# Patient Record
Sex: FEMALE | Race: WHITE | Employment: OTHER | URBAN - METROPOLITAN AREA
[De-identification: names, ages, dates, MRNs, and addresses within clinical notes are randomized per-mention and may not be internally consistent; named-entity substitution may affect disease eponyms.]

---

## 2017-01-01 ENCOUNTER — LAB ENCOUNTER (OUTPATIENT)
Dept: LAB | Age: 62
End: 2017-01-01
Attending: INTERNAL MEDICINE
Payer: MEDICARE

## 2017-01-01 ENCOUNTER — NURSE ONLY (OUTPATIENT)
Dept: LAB | Age: 62
End: 2017-01-01
Attending: INTERNAL MEDICINE
Payer: COMMERCIAL

## 2017-01-01 ENCOUNTER — LAB ENCOUNTER (OUTPATIENT)
Dept: LAB | Age: 62
End: 2017-01-01
Attending: FAMILY MEDICINE
Payer: MEDICARE

## 2017-01-01 ENCOUNTER — LAB REQUISITION (OUTPATIENT)
Dept: LAB | Facility: HOSPITAL | Age: 62
End: 2017-01-01
Attending: INTERNAL MEDICINE
Payer: MEDICARE

## 2017-01-01 ENCOUNTER — APPOINTMENT (OUTPATIENT)
Dept: CT IMAGING | Facility: HOSPITAL | Age: 62
End: 2017-01-01
Attending: EMERGENCY MEDICINE
Payer: MEDICARE

## 2017-01-01 ENCOUNTER — APPOINTMENT (OUTPATIENT)
Dept: MRI IMAGING | Facility: HOSPITAL | Age: 62
End: 2017-01-01
Attending: HOSPITALIST
Payer: MEDICARE

## 2017-01-01 ENCOUNTER — LAB ENCOUNTER (OUTPATIENT)
Dept: LAB | Age: 62
End: 2017-01-01
Attending: INTERNAL MEDICINE
Payer: COMMERCIAL

## 2017-01-01 ENCOUNTER — NURSE ONLY (OUTPATIENT)
Dept: LAB | Age: 62
End: 2017-01-01
Attending: INTERNAL MEDICINE
Payer: MEDICARE

## 2017-01-01 ENCOUNTER — LAB REQUISITION (OUTPATIENT)
Dept: LAB | Facility: HOSPITAL | Age: 62
End: 2017-01-01
Attending: INTERNAL MEDICINE
Payer: COMMERCIAL

## 2017-01-01 ENCOUNTER — HOSPITAL ENCOUNTER (OUTPATIENT)
Facility: HOSPITAL | Age: 62
Setting detail: OBSERVATION
Discharge: NURSING, REHAB OR LONG-TERM CARE | End: 2017-01-01
Attending: EMERGENCY MEDICINE | Admitting: INTERNAL MEDICINE
Payer: MEDICARE

## 2017-01-01 ENCOUNTER — HOSPITAL ENCOUNTER (OUTPATIENT)
Facility: HOSPITAL | Age: 62
Setting detail: HOSPITAL OUTPATIENT SURGERY
Discharge: NURSING, REHAB OR LONG-TERM CARE | End: 2017-01-01
Attending: SURGERY | Admitting: SURGERY
Payer: MEDICARE

## 2017-01-01 VITALS
BODY MASS INDEX: 33.74 KG/M2 | WEIGHT: 150 LBS | HEIGHT: 56 IN | HEART RATE: 78 BPM | SYSTOLIC BLOOD PRESSURE: 158 MMHG | OXYGEN SATURATION: 99 % | TEMPERATURE: 98 F | RESPIRATION RATE: 14 BRPM | DIASTOLIC BLOOD PRESSURE: 87 MMHG

## 2017-01-01 VITALS
WEIGHT: 152.75 LBS | BODY MASS INDEX: 32.07 KG/M2 | HEART RATE: 66 BPM | DIASTOLIC BLOOD PRESSURE: 47 MMHG | RESPIRATION RATE: 18 BRPM | OXYGEN SATURATION: 96 % | SYSTOLIC BLOOD PRESSURE: 124 MMHG | TEMPERATURE: 99 F | HEIGHT: 57.99 IN

## 2017-01-01 DIAGNOSIS — N18.9 CKD (CHRONIC KIDNEY DISEASE): Primary | ICD-10-CM

## 2017-01-01 DIAGNOSIS — R52 INTRACTABLE PAIN: ICD-10-CM

## 2017-01-01 DIAGNOSIS — I10 HTN (HYPERTENSION): Primary | ICD-10-CM

## 2017-01-01 DIAGNOSIS — E11.9 DM (DIABETES MELLITUS) (HCC): Primary | ICD-10-CM

## 2017-01-01 DIAGNOSIS — I10 HTN (HYPERTENSION): ICD-10-CM

## 2017-01-01 DIAGNOSIS — E11.9 DM (DIABETES MELLITUS) (HCC): ICD-10-CM

## 2017-01-01 DIAGNOSIS — D64.9 ANEMIA: ICD-10-CM

## 2017-01-01 DIAGNOSIS — R76.0 ABNORMAL BLOOD CARDIOLIPIN RATIO: Primary | ICD-10-CM

## 2017-01-01 DIAGNOSIS — Z79.01 LONG TERM (CURRENT) USE OF ANTICOAGULANTS: Primary | ICD-10-CM

## 2017-01-01 DIAGNOSIS — E11.9 DIABETES MELLITUS (HCC): Primary | ICD-10-CM

## 2017-01-01 DIAGNOSIS — R69 ILLNESS: Primary | ICD-10-CM

## 2017-01-01 DIAGNOSIS — G72.41 IBM (INCLUSION BODY MYOSITIS): Primary | ICD-10-CM

## 2017-01-01 DIAGNOSIS — R53.1 WEAKNESS: ICD-10-CM

## 2017-01-01 DIAGNOSIS — I51.9 HEART DISEASE: ICD-10-CM

## 2017-01-01 DIAGNOSIS — M54.12 CERVICAL RADICULOPATHY: Primary | ICD-10-CM

## 2017-01-01 DIAGNOSIS — B99.9 INFECTION: Primary | ICD-10-CM

## 2017-01-01 DIAGNOSIS — R69 DIAGNOSIS UNKNOWN: Primary | ICD-10-CM

## 2017-01-01 DIAGNOSIS — N39.0 UTI (URINARY TRACT INFECTION): ICD-10-CM

## 2017-01-01 DIAGNOSIS — Z01.818 PRE-OP TESTING: Primary | ICD-10-CM

## 2017-01-01 DIAGNOSIS — Z99.2 DIALYSIS PATIENT (HCC): ICD-10-CM

## 2017-01-01 LAB
ALBUMIN SERPL-MCNC: 2.5 G/DL (ref 3.5–4.8)
ALBUMIN SERPL-MCNC: 2.6 G/DL (ref 3.5–4.8)
ALBUMIN SERPL-MCNC: 2.7 G/DL (ref 3.5–4.8)
ALBUMIN SERPL-MCNC: 2.7 G/DL (ref 3.5–4.8)
ALBUMIN SERPL-MCNC: 2.9 G/DL (ref 3.5–4.8)
ALBUMIN SERPL-MCNC: 3 G/DL (ref 3.5–4.8)
ALBUMIN SERPL-MCNC: 3.2 G/DL (ref 3.5–4.8)
ALBUMIN SERPL-MCNC: 3.2 G/DL (ref 3.5–4.8)
ALBUMIN SERPL-MCNC: 3.5 G/DL (ref 3.5–4.8)
ALBUMIN SERPL-MCNC: 3.8 G/DL (ref 3.5–4.8)
ALP LIVER SERPL-CCNC: 138 U/L (ref 46–118)
ALP LIVER SERPL-CCNC: 155 U/L (ref 46–118)
ALP LIVER SERPL-CCNC: 167 U/L (ref 50–130)
ALP LIVER SERPL-CCNC: 170 U/L (ref 46–118)
ALP LIVER SERPL-CCNC: 180 U/L (ref 50–130)
ALP LIVER SERPL-CCNC: 186 U/L (ref 50–130)
ALP LIVER SERPL-CCNC: 186 U/L (ref 50–130)
ALP LIVER SERPL-CCNC: 192 U/L (ref 46–118)
ALP LIVER SERPL-CCNC: 193 U/L (ref 50–130)
ALP LIVER SERPL-CCNC: 265 U/L (ref 46–118)
ALT SERPL-CCNC: 10 U/L (ref 14–54)
ALT SERPL-CCNC: 12 U/L (ref 14–54)
ALT SERPL-CCNC: 13 U/L (ref 14–54)
ALT SERPL-CCNC: 14 U/L (ref 14–54)
ALT SERPL-CCNC: 14 U/L (ref 14–54)
ALT SERPL-CCNC: 15 U/L (ref 14–54)
ALT SERPL-CCNC: 19 U/L (ref 14–54)
ALT SERPL-CCNC: 25 U/L (ref 14–54)
ALT SERPL-CCNC: 7 U/L (ref 14–54)
ALT SERPL-CCNC: 9 U/L (ref 14–54)
APTT PPP: 30.2 SECONDS (ref 25–34)
AST SERPL-CCNC: 10 U/L (ref 15–41)
AST SERPL-CCNC: 10 U/L (ref 15–41)
AST SERPL-CCNC: 11 U/L (ref 15–41)
AST SERPL-CCNC: 11 U/L (ref 15–41)
AST SERPL-CCNC: 12 U/L (ref 15–41)
AST SERPL-CCNC: 14 U/L (ref 15–41)
AST SERPL-CCNC: 14 U/L (ref 15–41)
AST SERPL-CCNC: 15 U/L (ref 15–41)
AST SERPL-CCNC: 24 U/L (ref 15–41)
AST SERPL-CCNC: 8 U/L (ref 15–41)
ATRIAL RATE: 81 BPM
BAND %: 2 %
BAND %: 5 %
BASOPHIL % MANUAL: 0 %
BASOPHIL % MANUAL: 2 %
BASOPHIL ABSOLUTE MANUAL: 0 X10(3) UL (ref 0–0.1)
BASOPHIL ABSOLUTE MANUAL: 0.26 X10(3) UL (ref 0–0.1)
BASOPHILS # BLD AUTO: 0.01 X10(3) UL (ref 0–0.1)
BASOPHILS # BLD AUTO: 0.04 X10(3) UL (ref 0–0.1)
BASOPHILS # BLD AUTO: 0.05 X10(3) UL (ref 0–0.1)
BASOPHILS # BLD AUTO: 0.06 X10(3) UL (ref 0–0.1)
BASOPHILS # BLD AUTO: 0.07 X10(3) UL (ref 0–0.1)
BASOPHILS # BLD AUTO: 0.08 X10(3) UL (ref 0–0.1)
BASOPHILS # BLD AUTO: 0.1 X10(3) UL (ref 0–0.1)
BASOPHILS NFR BLD AUTO: 0.1 %
BASOPHILS NFR BLD AUTO: 0.4 %
BASOPHILS NFR BLD AUTO: 0.6 %
BASOPHILS NFR BLD AUTO: 0.6 %
BASOPHILS NFR BLD AUTO: 0.8 %
BASOPHILS NFR BLD AUTO: 1 %
BILIRUB SERPL-MCNC: 0.3 MG/DL (ref 0.1–2)
BILIRUB SERPL-MCNC: 0.4 MG/DL (ref 0.1–2)
BILIRUB SERPL-MCNC: 0.5 MG/DL (ref 0.1–2)
BILIRUB SERPL-MCNC: 0.5 MG/DL (ref 0.1–2)
BILIRUB SERPL-MCNC: 0.6 MG/DL (ref 0.1–2)
BILIRUB UR QL STRIP.AUTO: NEGATIVE
BUN BLD-MCNC: 22 MG/DL (ref 8–20)
BUN BLD-MCNC: 29 MG/DL (ref 8–20)
BUN BLD-MCNC: 36 MG/DL (ref 8–20)
BUN BLD-MCNC: 45 MG/DL (ref 8–20)
BUN BLD-MCNC: 46 MG/DL (ref 8–20)
BUN BLD-MCNC: 52 MG/DL (ref 8–20)
BUN BLD-MCNC: 52 MG/DL (ref 8–20)
BUN BLD-MCNC: 53 MG/DL (ref 8–20)
BUN BLD-MCNC: 58 MG/DL (ref 8–20)
BUN BLD-MCNC: 81 MG/DL (ref 8–20)
CALCIUM BLD-MCNC: 10 MG/DL (ref 8.3–10.3)
CALCIUM BLD-MCNC: 10.3 MG/DL (ref 8.3–10.3)
CALCIUM BLD-MCNC: 8.3 MG/DL (ref 8.3–10.3)
CALCIUM BLD-MCNC: 9.2 MG/DL (ref 8.3–10.3)
CALCIUM BLD-MCNC: 9.5 MG/DL (ref 8.3–10.3)
CALCIUM BLD-MCNC: 9.5 MG/DL (ref 8.3–10.3)
CALCIUM BLD-MCNC: 9.6 MG/DL (ref 8.3–10.3)
CALCIUM BLD-MCNC: 9.6 MG/DL (ref 8.3–10.3)
CALCIUM BLD-MCNC: 9.8 MG/DL (ref 8.3–10.3)
CALCIUM BLD-MCNC: 9.8 MG/DL (ref 8.3–10.3)
CALCIUM BLD-MCNC: 9.9 MG/DL (ref 8.3–10.3)
CALCIUM BLD-MCNC: 9.9 MG/DL (ref 8.3–10.3)
CHLORIDE: 101 MMOL/L (ref 101–111)
CHLORIDE: 102 MMOL/L (ref 101–111)
CHLORIDE: 103 MMOL/L (ref 101–111)
CHLORIDE: 103 MMOL/L (ref 101–111)
CHLORIDE: 104 MMOL/L (ref 101–111)
CHLORIDE: 97 MMOL/L (ref 101–111)
CHLORIDE: 98 MMOL/L (ref 101–111)
CHLORIDE: 99 MMOL/L (ref 101–111)
CHLORIDE: 99 MMOL/L (ref 101–111)
CO2: 24 MMOL/L (ref 22–32)
CO2: 25 MMOL/L (ref 22–32)
CO2: 26 MMOL/L (ref 22–32)
CO2: 27 MMOL/L (ref 22–32)
CO2: 28 MMOL/L (ref 22–32)
CO2: 28 MMOL/L (ref 22–32)
CO2: 30 MMOL/L (ref 22–32)
CO2: 30 MMOL/L (ref 22–32)
CO2: 31 MMOL/L (ref 22–32)
CO2: 31 MMOL/L (ref 22–32)
CREAT BLD-MCNC: 1.96 MG/DL (ref 0.55–1.02)
CREAT BLD-MCNC: 2.87 MG/DL (ref 0.55–1.02)
CREAT BLD-MCNC: 3.18 MG/DL (ref 0.55–1.02)
CREAT BLD-MCNC: 3.26 MG/DL (ref 0.55–1.02)
CREAT BLD-MCNC: 3.69 MG/DL (ref 0.55–1.02)
CREAT BLD-MCNC: 3.76 MG/DL (ref 0.55–1.02)
CREAT BLD-MCNC: 3.87 MG/DL (ref 0.55–1.02)
CREAT BLD-MCNC: 3.98 MG/DL (ref 0.55–1.02)
CREAT BLD-MCNC: 4.24 MG/DL (ref 0.55–1.02)
CREAT BLD-MCNC: 4.46 MG/DL (ref 0.55–1.02)
CREAT BLD-MCNC: 5.24 MG/DL (ref 0.55–1.02)
CREAT BLD-MCNC: 5.34 MG/DL (ref 0.55–1.02)
EOSINOPHIL # BLD AUTO: 0 X10(3) UL (ref 0–0.3)
EOSINOPHIL # BLD AUTO: 0.16 X10(3) UL (ref 0–0.3)
EOSINOPHIL # BLD AUTO: 0.39 X10(3) UL (ref 0–0.3)
EOSINOPHIL # BLD AUTO: 0.49 X10(3) UL (ref 0–0.3)
EOSINOPHIL # BLD AUTO: 0.51 X10(3) UL (ref 0–0.3)
EOSINOPHIL # BLD AUTO: 0.57 X10(3) UL (ref 0–0.3)
EOSINOPHIL # BLD AUTO: 0.59 X10(3) UL (ref 0–0.3)
EOSINOPHIL # BLD AUTO: 0.61 X10(3) UL (ref 0–0.3)
EOSINOPHIL # BLD AUTO: 0.77 X10(3) UL (ref 0–0.3)
EOSINOPHIL % MANUAL: 4 %
EOSINOPHIL % MANUAL: 9 %
EOSINOPHIL ABSOLUTE MANUAL: 0.45 X10(3) UL (ref 0–0.3)
EOSINOPHIL ABSOLUTE MANUAL: 1.16 X10(3) UL (ref 0–0.3)
EOSINOPHIL NFR BLD AUTO: 0 %
EOSINOPHIL NFR BLD AUTO: 1.7 %
EOSINOPHIL NFR BLD AUTO: 10.9 %
EOSINOPHIL NFR BLD AUTO: 3.7 %
EOSINOPHIL NFR BLD AUTO: 4 %
EOSINOPHIL NFR BLD AUTO: 5.9 %
EOSINOPHIL NFR BLD AUTO: 6 %
EOSINOPHIL NFR BLD AUTO: 6.3 %
EOSINOPHIL NFR BLD AUTO: 7.3 %
ERYTHROCYTE [DISTWIDTH] IN BLOOD BY AUTOMATED COUNT: 12.9 % (ref 11.5–16)
ERYTHROCYTE [DISTWIDTH] IN BLOOD BY AUTOMATED COUNT: 13 % (ref 11.5–16)
ERYTHROCYTE [DISTWIDTH] IN BLOOD BY AUTOMATED COUNT: 13.1 % (ref 11.5–16)
ERYTHROCYTE [DISTWIDTH] IN BLOOD BY AUTOMATED COUNT: 13.2 % (ref 11.5–16)
ERYTHROCYTE [DISTWIDTH] IN BLOOD BY AUTOMATED COUNT: 14.2 % (ref 11.5–16)
ERYTHROCYTE [DISTWIDTH] IN BLOOD BY AUTOMATED COUNT: 15.6 % (ref 11.5–16)
ERYTHROCYTE [DISTWIDTH] IN BLOOD BY AUTOMATED COUNT: 16 % (ref 11.5–16)
ERYTHROCYTE [DISTWIDTH] IN BLOOD BY AUTOMATED COUNT: 16.1 % (ref 11.5–16)
ERYTHROCYTE [DISTWIDTH] IN BLOOD BY AUTOMATED COUNT: 16.7 % (ref 11.5–16)
ERYTHROCYTE [DISTWIDTH] IN BLOOD BY AUTOMATED COUNT: 18.3 % (ref 11.5–16)
EST. AVERAGE GLUCOSE BLD GHB EST-MCNC: 123 MG/DL (ref 68–126)
GLUCOSE BLD-MCNC: 109 MG/DL (ref 65–99)
GLUCOSE BLD-MCNC: 110 MG/DL (ref 65–99)
GLUCOSE BLD-MCNC: 123 MG/DL (ref 70–99)
GLUCOSE BLD-MCNC: 124 MG/DL (ref 70–99)
GLUCOSE BLD-MCNC: 125 MG/DL (ref 70–99)
GLUCOSE BLD-MCNC: 126 MG/DL (ref 70–99)
GLUCOSE BLD-MCNC: 129 MG/DL (ref 65–99)
GLUCOSE BLD-MCNC: 133 MG/DL (ref 70–99)
GLUCOSE BLD-MCNC: 139 MG/DL (ref 65–99)
GLUCOSE BLD-MCNC: 151 MG/DL (ref 70–99)
GLUCOSE BLD-MCNC: 156 MG/DL (ref 70–99)
GLUCOSE BLD-MCNC: 188 MG/DL (ref 65–99)
GLUCOSE BLD-MCNC: 255 MG/DL (ref 65–99)
GLUCOSE BLD-MCNC: 61 MG/DL (ref 65–99)
GLUCOSE BLD-MCNC: 72 MG/DL (ref 65–99)
GLUCOSE BLD-MCNC: 73 MG/DL (ref 70–99)
GLUCOSE BLD-MCNC: 74 MG/DL (ref 70–99)
GLUCOSE BLD-MCNC: 82 MG/DL (ref 65–99)
GLUCOSE BLD-MCNC: 83 MG/DL (ref 65–99)
GLUCOSE BLD-MCNC: 91 MG/DL (ref 70–99)
GLUCOSE BLD-MCNC: 94 MG/DL (ref 70–99)
GLUCOSE BLD-MCNC: 99 MG/DL (ref 70–99)
GLUCOSE UR STRIP.AUTO-MCNC: NEGATIVE MG/DL
HAV IGM SER QL: 2.5 MG/DL (ref 1.7–3)
HBA1C MFR BLD HPLC: 5.9 % (ref ?–5.7)
HBV SURFACE AG SERPL QL IA: NONREACTIVE
HCT VFR BLD AUTO: 27.5 % (ref 34–50)
HCT VFR BLD AUTO: 28.4 % (ref 34–50)
HCT VFR BLD AUTO: 30 % (ref 34–50)
HCT VFR BLD AUTO: 31 % (ref 34–50)
HCT VFR BLD AUTO: 31.3 % (ref 34–50)
HCT VFR BLD AUTO: 31.5 % (ref 34–50)
HCT VFR BLD AUTO: 32 % (ref 34–50)
HCT VFR BLD AUTO: 32.6 % (ref 34–50)
HCT VFR BLD AUTO: 32.8 % (ref 34–50)
HCT VFR BLD AUTO: 33.3 % (ref 34–50)
HCT VFR BLD AUTO: 34.9 % (ref 34–50)
HCT VFR BLD AUTO: 35.7 % (ref 34–50)
HGB BLD-MCNC: 10 G/DL (ref 12–16)
HGB BLD-MCNC: 10.2 G/DL (ref 12–16)
HGB BLD-MCNC: 10.3 G/DL (ref 12–16)
HGB BLD-MCNC: 10.7 G/DL (ref 12–16)
HGB BLD-MCNC: 10.9 G/DL (ref 12–16)
HGB BLD-MCNC: 8.9 G/DL (ref 12–16)
HGB BLD-MCNC: 9.1 G/DL (ref 12–16)
HGB BLD-MCNC: 9.2 G/DL (ref 12–16)
HGB BLD-MCNC: 9.3 G/DL (ref 12–16)
HGB BLD-MCNC: 9.6 G/DL (ref 12–16)
HGB BLD-MCNC: 9.9 G/DL (ref 12–16)
HGB BLD-MCNC: 9.9 G/DL (ref 12–16)
IMMATURE GRANULOCYTE COUNT: 0.02 X10(3) UL (ref 0–1)
IMMATURE GRANULOCYTE COUNT: 0.04 X10(3) UL (ref 0–1)
IMMATURE GRANULOCYTE COUNT: 0.05 X10(3) UL (ref 0–1)
IMMATURE GRANULOCYTE COUNT: 0.06 X10(3) UL (ref 0–1)
IMMATURE GRANULOCYTE COUNT: 0.09 X10(3) UL (ref 0–1)
IMMATURE GRANULOCYTE COUNT: 0.13 X10(3) UL (ref 0–1)
IMMATURE GRANULOCYTE COUNT: 0.16 X10(3) UL (ref 0–1)
IMMATURE GRANULOCYTE RATIO %: 0.2 %
IMMATURE GRANULOCYTE RATIO %: 0.3 %
IMMATURE GRANULOCYTE RATIO %: 0.3 %
IMMATURE GRANULOCYTE RATIO %: 0.4 %
IMMATURE GRANULOCYTE RATIO %: 0.6 %
IMMATURE GRANULOCYTE RATIO %: 0.7 %
IMMATURE GRANULOCYTE RATIO %: 0.9 %
IMMATURE GRANULOCYTE RATIO %: 1.3 %
IMMATURE GRANULOCYTE RATIO %: 1.4 %
INR BLD: 1.07 (ref 0.89–1.11)
INR BLD: 1.08 (ref 0.89–1.11)
KETONES UR STRIP.AUTO-MCNC: NEGATIVE MG/DL
LEVETIRACETAM (KEPPRA): 35 UG/ML
LYMPHOCYTE % MANUAL: 15 %
LYMPHOCYTE % MANUAL: 8 %
LYMPHOCYTE ABSOLUTE MANUAL: 1.03 X10(3) UL (ref 0.9–4)
LYMPHOCYTE ABSOLUTE MANUAL: 1.68 X10(3) UL (ref 0.9–4)
LYMPHOCYTES # BLD AUTO: 0.76 X10(3) UL (ref 0.9–4)
LYMPHOCYTES # BLD AUTO: 0.79 X10(3) UL (ref 0.9–4)
LYMPHOCYTES # BLD AUTO: 1.23 X10(3) UL (ref 0.9–4)
LYMPHOCYTES # BLD AUTO: 1.55 X10(3) UL (ref 0.9–4)
LYMPHOCYTES # BLD AUTO: 1.79 X10(3) UL (ref 0.9–4)
LYMPHOCYTES # BLD AUTO: 1.85 X10(3) UL (ref 0.9–4)
LYMPHOCYTES # BLD AUTO: 1.99 X10(3) UL (ref 0.9–4)
LYMPHOCYTES # BLD AUTO: 2.06 X10(3) UL (ref 0.9–4)
LYMPHOCYTES # BLD AUTO: 2.5 X10(3) UL (ref 0.9–4)
LYMPHOCYTES NFR BLD AUTO: 11.3 %
LYMPHOCYTES NFR BLD AUTO: 11.8 %
LYMPHOCYTES NFR BLD AUTO: 16.1 %
LYMPHOCYTES NFR BLD AUTO: 18.8 %
LYMPHOCYTES NFR BLD AUTO: 19.8 %
LYMPHOCYTES NFR BLD AUTO: 22 %
LYMPHOCYTES NFR BLD AUTO: 24.8 %
LYMPHOCYTES NFR BLD AUTO: 29 %
LYMPHOCYTES NFR BLD AUTO: 8.6 %
M PROTEIN MFR SERPL ELPH: 5.8 G/DL (ref 6.1–8.3)
M PROTEIN MFR SERPL ELPH: 6.2 G/DL (ref 6.1–8.3)
M PROTEIN MFR SERPL ELPH: 6.4 G/DL (ref 6.1–8.3)
M PROTEIN MFR SERPL ELPH: 6.5 G/DL (ref 6.1–8.3)
M PROTEIN MFR SERPL ELPH: 6.6 G/DL (ref 6.1–8.3)
M PROTEIN MFR SERPL ELPH: 6.7 G/DL (ref 6.1–8.3)
M PROTEIN MFR SERPL ELPH: 6.8 G/DL (ref 6.1–8.3)
M PROTEIN MFR SERPL ELPH: 7.4 G/DL (ref 6.1–8.3)
MCH RBC QN AUTO: 27.3 PG (ref 27–33.2)
MCH RBC QN AUTO: 28.2 PG (ref 27–33.2)
MCH RBC QN AUTO: 28.3 PG (ref 27–33.2)
MCH RBC QN AUTO: 29.2 PG (ref 27–33.2)
MCH RBC QN AUTO: 29.9 PG (ref 27–33.2)
MCH RBC QN AUTO: 30.9 PG (ref 27–33.2)
MCH RBC QN AUTO: 31 PG (ref 27–33.2)
MCH RBC QN AUTO: 31.8 PG (ref 27–33.2)
MCH RBC QN AUTO: 32 PG (ref 27–33.2)
MCH RBC QN AUTO: 32 PG (ref 27–33.2)
MCH RBC QN AUTO: 32.3 PG (ref 27–33.2)
MCH RBC QN AUTO: 32.4 PG (ref 27–33.2)
MCHC RBC AUTO-ENTMCNC: 28.6 G/DL (ref 31–37)
MCHC RBC AUTO-ENTMCNC: 28.8 G/DL (ref 31–37)
MCHC RBC AUTO-ENTMCNC: 29.5 G/DL (ref 31–37)
MCHC RBC AUTO-ENTMCNC: 29.7 G/DL (ref 31–37)
MCHC RBC AUTO-ENTMCNC: 30.2 G/DL (ref 31–37)
MCHC RBC AUTO-ENTMCNC: 31.7 G/DL (ref 31–37)
MCHC RBC AUTO-ENTMCNC: 31.9 G/DL (ref 31–37)
MCHC RBC AUTO-ENTMCNC: 32 G/DL (ref 31–37)
MCHC RBC AUTO-ENTMCNC: 32 G/DL (ref 31–37)
MCHC RBC AUTO-ENTMCNC: 32.4 G/DL (ref 31–37)
MCHC RBC AUTO-ENTMCNC: 32.7 G/DL (ref 31–37)
MCHC RBC AUTO-ENTMCNC: 32.8 G/DL (ref 31–37)
MCV RBC AUTO: 100.3 FL (ref 81–100)
MCV RBC AUTO: 101 FL (ref 81–100)
MCV RBC AUTO: 101.1 FL (ref 81–100)
MCV RBC AUTO: 94.6 FL (ref 81–100)
MCV RBC AUTO: 95.6 FL (ref 81–100)
MCV RBC AUTO: 95.7 FL (ref 81–100)
MCV RBC AUTO: 96.7 FL (ref 81–100)
MCV RBC AUTO: 97.2 FL (ref 81–100)
MCV RBC AUTO: 98.4 FL (ref 81–100)
MCV RBC AUTO: 98.5 FL (ref 81–100)
MCV RBC AUTO: 98.9 FL (ref 81–100)
MCV RBC AUTO: 99.1 FL (ref 81–100)
METAMYELOCYTE %: 1 %
METAMYELOCYTE ABSOLUTE MANUAL: 0.13 X10(3) UL (ref ?–0.01)
MONOCYTE % MANUAL: 1 %
MONOCYTE % MANUAL: 5 %
MONOCYTE ABSOLUTE MANUAL: 0.11 X10(3) UL (ref 0.1–0.6)
MONOCYTE ABSOLUTE MANUAL: 0.65 X10(3) UL (ref 0.1–0.6)
MONOCYTES # BLD AUTO: 0.3 X10(3) UL (ref 0.1–0.6)
MONOCYTES # BLD AUTO: 0.98 X10(3) UL (ref 0.1–0.6)
MONOCYTES # BLD AUTO: 0.99 X10(3) UL (ref 0.1–0.6)
MONOCYTES # BLD AUTO: 1.01 X10(3) UL (ref 0.1–0.6)
MONOCYTES # BLD AUTO: 1.03 X10(3) UL (ref 0.1–0.6)
MONOCYTES # BLD AUTO: 1.08 X10(3) UL (ref 0.1–0.6)
MONOCYTES # BLD AUTO: 1.1 X10(3) UL (ref 0.1–0.6)
MONOCYTES # BLD AUTO: 1.1 X10(3) UL (ref 0.1–0.6)
MONOCYTES # BLD AUTO: 1.32 X10(3) UL (ref 0.1–0.6)
MONOCYTES NFR BLD AUTO: 10.7 %
MONOCYTES NFR BLD AUTO: 11 %
MONOCYTES NFR BLD AUTO: 11 %
MONOCYTES NFR BLD AUTO: 11.4 %
MONOCYTES NFR BLD AUTO: 12.2 %
MONOCYTES NFR BLD AUTO: 13.2 %
MONOCYTES NFR BLD AUTO: 14.6 %
MONOCYTES NFR BLD AUTO: 4.5 %
MONOCYTES NFR BLD AUTO: 9.5 %
MORPHOLOGY: NORMAL
NEUTROPHIL ABS PRELIM: 3.6 X10 (3) UL (ref 1.3–6.7)
NEUTROPHIL ABS PRELIM: 4.45 X10 (3) UL (ref 1.3–6.7)
NEUTROPHIL ABS PRELIM: 4.54 X10 (3) UL (ref 1.3–6.7)
NEUTROPHIL ABS PRELIM: 5.44 X10 (3) UL (ref 1.3–6.7)
NEUTROPHIL ABS PRELIM: 5.63 X10 (3) UL (ref 1.3–6.7)
NEUTROPHIL ABS PRELIM: 6.16 X10 (3) UL (ref 1.3–6.7)
NEUTROPHIL ABS PRELIM: 7.04 X10 (3) UL (ref 1.3–6.7)
NEUTROPHIL ABS PRELIM: 7.72 X10 (3) UL (ref 1.3–6.7)
NEUTROPHIL ABS PRELIM: 7.77 X10 (3) UL (ref 1.3–6.7)
NEUTROPHIL ABS PRELIM: 8.3 X10 (3) UL (ref 1.3–6.7)
NEUTROPHIL ABS PRELIM: 8.44 X10 (3) UL (ref 1.3–6.7)
NEUTROPHIL ABSOLUTE MANUAL: 8.96 X10(3) UL (ref 1.3–6.7)
NEUTROPHIL ABSOLUTE MANUAL: 9.68 X10(3) UL (ref 1.3–6.7)
NEUTROPHILS # BLD AUTO: 3.6 X10(3) UL (ref 1.3–6.7)
NEUTROPHILS # BLD AUTO: 4.45 X10(3) UL (ref 1.3–6.7)
NEUTROPHILS # BLD AUTO: 4.54 X10(3) UL (ref 1.3–6.7)
NEUTROPHILS # BLD AUTO: 5.44 X10(3) UL (ref 1.3–6.7)
NEUTROPHILS # BLD AUTO: 5.63 X10(3) UL (ref 1.3–6.7)
NEUTROPHILS # BLD AUTO: 6.16 X10(3) UL (ref 1.3–6.7)
NEUTROPHILS # BLD AUTO: 7.04 X10(3) UL (ref 1.3–6.7)
NEUTROPHILS # BLD AUTO: 7.72 X10(3) UL (ref 1.3–6.7)
NEUTROPHILS # BLD AUTO: 8.3 X10(3) UL (ref 1.3–6.7)
NEUTROPHILS % MANUAL: 70 %
NEUTROPHILS % MANUAL: 78 %
NEUTROPHILS NFR BLD AUTO: 51.2 %
NEUTROPHILS NFR BLD AUTO: 52.7 %
NEUTROPHILS NFR BLD AUTO: 53.5 %
NEUTROPHILS NFR BLD AUTO: 60.2 %
NEUTROPHILS NFR BLD AUTO: 62.5 %
NEUTROPHILS NFR BLD AUTO: 67.1 %
NEUTROPHILS NFR BLD AUTO: 74 %
NEUTROPHILS NFR BLD AUTO: 76.9 %
NEUTROPHILS NFR BLD AUTO: 83.8 %
NITRITE UR QL STRIP.AUTO: NEGATIVE
P AXIS: 46 DEGREES
P-R INTERVAL: 174 MS
PH UR STRIP.AUTO: 5 [PH] (ref 4.5–8)
PHOSPHATE SERPL-MCNC: 5 MG/DL (ref 2.5–4.9)
PLATELET # BLD AUTO: 157 10(3)UL (ref 150–450)
PLATELET # BLD AUTO: 159 10(3)UL (ref 150–450)
PLATELET # BLD AUTO: 180 10(3)UL (ref 150–450)
PLATELET # BLD AUTO: 186 10(3)UL (ref 150–450)
PLATELET # BLD AUTO: 191 10(3)UL (ref 150–450)
PLATELET # BLD AUTO: 195 10(3)UL (ref 150–450)
PLATELET # BLD AUTO: 199 10(3)UL (ref 150–450)
PLATELET # BLD AUTO: 200 10(3)UL (ref 150–450)
PLATELET # BLD AUTO: 202 10(3)UL (ref 150–450)
PLATELET # BLD AUTO: 206 10(3)UL (ref 150–450)
PLATELET # BLD AUTO: 208 10(3)UL (ref 150–450)
PLATELET # BLD AUTO: 297 10(3)UL (ref 150–450)
PLATELET MORPHOLOGY: NORMAL
POTASSIUM SERPL-SCNC: 4 MMOL/L (ref 3.6–5.1)
POTASSIUM SERPL-SCNC: 4.2 MMOL/L (ref 3.6–5.1)
POTASSIUM SERPL-SCNC: 4.6 MMOL/L (ref 3.6–5.1)
POTASSIUM SERPL-SCNC: 4.7 MMOL/L (ref 3.6–5.1)
POTASSIUM SERPL-SCNC: 4.7 MMOL/L (ref 3.6–5.1)
POTASSIUM SERPL-SCNC: 4.8 MMOL/L (ref 3.6–5.1)
POTASSIUM SERPL-SCNC: 4.9 MMOL/L (ref 3.6–5.1)
POTASSIUM SERPL-SCNC: 5.3 MMOL/L (ref 3.6–5.1)
POTASSIUM SERPL-SCNC: 5.3 MMOL/L (ref 3.6–5.1)
POTASSIUM SERPL-SCNC: 5.6 MMOL/L (ref 3.6–5.1)
PROT UR STRIP.AUTO-MCNC: 100 MG/DL
PSA SERPL DL<=0.01 NG/ML-MCNC: 13.9 SECONDS (ref 12–14.3)
PSA SERPL DL<=0.01 NG/ML-MCNC: 14 SECONDS (ref 12–14.3)
Q-T INTERVAL: 404 MS
QRS DURATION: 86 MS
QTC CALCULATION (BEZET): 469 MS
R AXIS: -8 DEGREES
RBC # BLD AUTO: 2.78 X10(6)UL (ref 3.8–5.1)
RBC # BLD AUTO: 2.81 X10(6)UL (ref 3.8–5.1)
RBC # BLD AUTO: 2.97 X10(6)UL (ref 3.8–5.1)
RBC # BLD AUTO: 3.09 X10(6)UL (ref 3.8–5.1)
RBC # BLD AUTO: 3.18 X10(6)UL (ref 3.8–5.1)
RBC # BLD AUTO: 3.24 X10(6)UL (ref 3.8–5.1)
RBC # BLD AUTO: 3.25 X10(6)UL (ref 3.8–5.1)
RBC # BLD AUTO: 3.31 X10(6)UL (ref 3.8–5.1)
RBC # BLD AUTO: 3.37 X10(6)UL (ref 3.8–5.1)
RBC # BLD AUTO: 3.52 X10(6)UL (ref 3.8–5.1)
RBC # BLD AUTO: 3.65 X10(6)UL (ref 3.8–5.1)
RBC # BLD AUTO: 3.73 X10(6)UL (ref 3.8–5.1)
RBC #/AREA URNS AUTO: >10 /HPF
RED CELL DISTRIBUTION WIDTH-SD: 45.2 FL (ref 35.1–46.3)
RED CELL DISTRIBUTION WIDTH-SD: 46.3 FL (ref 35.1–46.3)
RED CELL DISTRIBUTION WIDTH-SD: 46.6 FL (ref 35.1–46.3)
RED CELL DISTRIBUTION WIDTH-SD: 46.6 FL (ref 35.1–46.3)
RED CELL DISTRIBUTION WIDTH-SD: 48.1 FL (ref 35.1–46.3)
RED CELL DISTRIBUTION WIDTH-SD: 49.2 FL (ref 35.1–46.3)
RED CELL DISTRIBUTION WIDTH-SD: 50.8 FL (ref 35.1–46.3)
RED CELL DISTRIBUTION WIDTH-SD: 56.1 FL (ref 35.1–46.3)
RED CELL DISTRIBUTION WIDTH-SD: 56.4 FL (ref 35.1–46.3)
RED CELL DISTRIBUTION WIDTH-SD: 57.2 FL (ref 35.1–46.3)
RED CELL DISTRIBUTION WIDTH-SD: 57.8 FL (ref 35.1–46.3)
RED CELL DISTRIBUTION WIDTH-SD: 63.7 FL (ref 35.1–46.3)
SODIUM SERPL-SCNC: 133 MMOL/L (ref 136–144)
SODIUM SERPL-SCNC: 134 MMOL/L (ref 136–144)
SODIUM SERPL-SCNC: 134 MMOL/L (ref 136–144)
SODIUM SERPL-SCNC: 135 MMOL/L (ref 136–144)
SODIUM SERPL-SCNC: 136 MMOL/L (ref 136–144)
SODIUM SERPL-SCNC: 136 MMOL/L (ref 136–144)
SODIUM SERPL-SCNC: 138 MMOL/L (ref 136–144)
SODIUM SERPL-SCNC: 139 MMOL/L (ref 136–144)
SODIUM SERPL-SCNC: 140 MMOL/L (ref 136–144)
SODIUM SERPL-SCNC: 140 MMOL/L (ref 136–144)
SODIUM SERPL-SCNC: 141 MMOL/L (ref 136–144)
SODIUM SERPL-SCNC: 142 MMOL/L (ref 136–144)
SP GR UR STRIP.AUTO: 1.02 (ref 1–1.03)
T AXIS: 33 DEGREES
TOTAL CELLS COUNTED: 100
TOTAL CELLS COUNTED: 100
UROBILINOGEN UR STRIP.AUTO-MCNC: <2 MG/DL
VENTRICULAR RATE: 81 BPM
WBC # BLD AUTO: 10.4 X10(3) UL (ref 4–13)
WBC # BLD AUTO: 11.2 X10(3) UL (ref 4–13)
WBC # BLD AUTO: 12.4 X10(3) UL (ref 4–13)
WBC # BLD AUTO: 12.9 X10(3) UL (ref 4–13)
WBC # BLD AUTO: 6.7 X10(3) UL (ref 4–13)
WBC # BLD AUTO: 7 X10(3) UL (ref 4–13)
WBC # BLD AUTO: 8.2 X10(3) UL (ref 4–13)
WBC # BLD AUTO: 8.3 X10(3) UL (ref 4–13)
WBC # BLD AUTO: 8.6 X10(3) UL (ref 4–13)
WBC # BLD AUTO: 9 X10(3) UL (ref 4–13)
WBC # BLD AUTO: 9.2 X10(3) UL (ref 4–13)
WBC # BLD AUTO: 9.9 X10(3) UL (ref 4–13)
WBC #/AREA URNS AUTO: >50 /HPF
WBC CLUMPS UR QL AUTO: PRESENT

## 2017-01-01 PROCEDURE — 36415 COLL VENOUS BLD VENIPUNCTURE: CPT

## 2017-01-01 PROCEDURE — 80053 COMPREHEN METABOLIC PANEL: CPT

## 2017-01-01 PROCEDURE — 85025 COMPLETE CBC W/AUTO DIFF WBC: CPT

## 2017-01-01 PROCEDURE — 85027 COMPLETE CBC AUTOMATED: CPT

## 2017-01-01 PROCEDURE — 85007 BL SMEAR W/DIFF WBC COUNT: CPT

## 2017-01-01 PROCEDURE — 72125 CT NECK SPINE W/O DYE: CPT | Performed by: EMERGENCY MEDICINE

## 2017-01-01 PROCEDURE — 87077 CULTURE AEROBIC IDENTIFY: CPT

## 2017-01-01 PROCEDURE — 87186 SC STD MICRODIL/AGAR DIL: CPT

## 2017-01-01 PROCEDURE — 85027 COMPLETE CBC AUTOMATED: CPT | Performed by: INTERNAL MEDICINE

## 2017-01-01 PROCEDURE — 87493 C DIFF AMPLIFIED PROBE: CPT

## 2017-01-01 PROCEDURE — 87184 SC STD DISK METHOD PER PLATE: CPT

## 2017-01-01 PROCEDURE — 90792 PSYCH DIAG EVAL W/MED SRVCS: CPT | Performed by: OTHER

## 2017-01-01 PROCEDURE — 85730 THROMBOPLASTIN TIME PARTIAL: CPT

## 2017-01-01 PROCEDURE — 5A1D00Z PERFORMANCE OF URINARY FILTRATION, SINGLE: ICD-10-PCS | Performed by: INTERNAL MEDICINE

## 2017-01-01 PROCEDURE — 80177 DRUG SCRN QUAN LEVETIRACETAM: CPT | Performed by: INTERNAL MEDICINE

## 2017-01-01 PROCEDURE — 87086 URINE CULTURE/COLONY COUNT: CPT

## 2017-01-01 PROCEDURE — 81001 URINALYSIS AUTO W/SCOPE: CPT

## 2017-01-01 PROCEDURE — 80053 COMPREHEN METABOLIC PANEL: CPT | Performed by: INTERNAL MEDICINE

## 2017-01-01 PROCEDURE — 85610 PROTHROMBIN TIME: CPT

## 2017-01-01 PROCEDURE — 85007 BL SMEAR W/DIFF WBC COUNT: CPT | Performed by: INTERNAL MEDICINE

## 2017-01-01 PROCEDURE — 72141 MRI NECK SPINE W/O DYE: CPT | Performed by: HOSPITALIST

## 2017-01-01 PROCEDURE — 80177 DRUG SCRN QUAN LEVETIRACETAM: CPT

## 2017-01-01 RX ORDER — TRAMADOL HYDROCHLORIDE 50 MG/1
50 TABLET ORAL EVERY 12 HOURS PRN
Status: DISCONTINUED | OUTPATIENT
Start: 2017-01-01 | End: 2017-01-01

## 2017-01-01 RX ORDER — FLUCONAZOLE 100 MG/1
150 TABLET ORAL ONCE
Status: COMPLETED | OUTPATIENT
Start: 2017-01-01 | End: 2017-01-01

## 2017-01-01 RX ORDER — DIPHENHYDRAMINE HCL 25 MG
25 CAPSULE ORAL EVERY 6 HOURS PRN
Status: DISCONTINUED | OUTPATIENT
Start: 2017-01-01 | End: 2017-01-01

## 2017-01-01 RX ORDER — METOPROLOL TARTRATE 50 MG/1
50 TABLET, FILM COATED ORAL DAILY
Status: ON HOLD | COMMUNITY
End: 2017-01-01

## 2017-01-01 RX ORDER — ONDANSETRON 4 MG/1
4 TABLET, ORALLY DISINTEGRATING ORAL EVERY 8 HOURS PRN
COMMUNITY
End: 2018-01-01

## 2017-01-01 RX ORDER — ALBUTEROL SULFATE 2.5 MG/3ML
2.5 SOLUTION RESPIRATORY (INHALATION) EVERY 6 HOURS PRN
Status: DISCONTINUED | OUTPATIENT
Start: 2017-01-01 | End: 2017-01-01

## 2017-01-01 RX ORDER — HYDROMORPHONE HYDROCHLORIDE 1 MG/ML
0.2 INJECTION, SOLUTION INTRAMUSCULAR; INTRAVENOUS; SUBCUTANEOUS EVERY 2 HOUR PRN
Status: DISCONTINUED | OUTPATIENT
Start: 2017-01-01 | End: 2017-01-01

## 2017-01-01 RX ORDER — HYDROCODONE BITARTRATE AND ACETAMINOPHEN 5; 325 MG/1; MG/1
1 TABLET ORAL EVERY 4 HOURS PRN
Status: DISCONTINUED | OUTPATIENT
Start: 2017-01-01 | End: 2017-01-01

## 2017-01-01 RX ORDER — GABAPENTIN 100 MG/1
100 CAPSULE ORAL 3 TIMES DAILY
Status: DISCONTINUED | OUTPATIENT
Start: 2017-01-01 | End: 2017-01-01

## 2017-01-01 RX ORDER — ALPRAZOLAM 0.25 MG/1
0.25 TABLET ORAL 4 TIMES DAILY PRN
Status: DISCONTINUED | OUTPATIENT
Start: 2017-01-01 | End: 2017-01-01

## 2017-01-01 RX ORDER — GABAPENTIN 100 MG/1
CAPSULE ORAL
Qty: 40 CAPSULE | Refills: 0 | Status: SHIPPED | OUTPATIENT
Start: 2017-01-01

## 2017-01-01 RX ORDER — MIDODRINE HYDROCHLORIDE 5 MG/1
10 TABLET ORAL
Status: DISCONTINUED | OUTPATIENT
Start: 2017-01-01 | End: 2017-01-01

## 2017-01-01 RX ORDER — ASCORBIC ACID, THIAMINE, RIBOFLAVIN, NIACINAMIDE, PYRIDOXINE, FOLIC ACID, COBALAMIN, BIOTIN, PANTOTHENIC ACID 100; 1.5; 1.7; 20; 10; 1; 6; 300; 1 MG/1; MG/1; MG/1; MG/1; MG/1; MG/1; UG/1; UG/1; MG/1
1 TABLET, COATED ORAL DAILY
Status: DISCONTINUED | OUTPATIENT
Start: 2017-01-01 | End: 2017-01-01

## 2017-01-01 RX ORDER — MONTELUKAST SODIUM 10 MG/1
10 TABLET ORAL NIGHTLY
Status: DISCONTINUED | OUTPATIENT
Start: 2017-01-01 | End: 2017-01-01

## 2017-01-01 RX ORDER — HYDROCODONE BITARTRATE AND ACETAMINOPHEN 5; 325 MG/1; MG/1
1 TABLET ORAL EVERY 6 HOURS PRN
COMMUNITY

## 2017-01-01 RX ORDER — GABAPENTIN 100 MG/1
100 CAPSULE ORAL 3 TIMES DAILY
Qty: 90 CAPSULE | Refills: 0 | Status: SHIPPED | OUTPATIENT
Start: 2017-01-01 | End: 2017-01-01

## 2017-01-01 RX ORDER — PANTOPRAZOLE SODIUM 40 MG/1
40 TABLET, DELAYED RELEASE ORAL
Status: DISCONTINUED | OUTPATIENT
Start: 2017-01-01 | End: 2017-01-01

## 2017-01-01 RX ORDER — ACETAMINOPHEN 325 MG/1
650 TABLET ORAL EVERY 8 HOURS PRN
Status: DISCONTINUED | OUTPATIENT
Start: 2017-01-01 | End: 2017-01-01

## 2017-01-01 RX ORDER — HYDROMORPHONE HYDROCHLORIDE 1 MG/ML
0.8 INJECTION, SOLUTION INTRAMUSCULAR; INTRAVENOUS; SUBCUTANEOUS EVERY 2 HOUR PRN
Status: DISCONTINUED | OUTPATIENT
Start: 2017-01-01 | End: 2017-01-01

## 2017-01-01 RX ORDER — METHYLPREDNISOLONE SODIUM SUCCINATE 125 MG/2ML
125 INJECTION, POWDER, LYOPHILIZED, FOR SOLUTION INTRAMUSCULAR; INTRAVENOUS ONCE
Status: COMPLETED | OUTPATIENT
Start: 2017-01-01 | End: 2017-01-01

## 2017-01-01 RX ORDER — ESCITALOPRAM OXALATE 10 MG/1
10 TABLET ORAL EVERY MORNING
Status: DISCONTINUED | OUTPATIENT
Start: 2017-01-01 | End: 2017-01-01

## 2017-01-01 RX ORDER — HEPARIN SODIUM 5000 [USP'U]/ML
5000 INJECTION, SOLUTION INTRAVENOUS; SUBCUTANEOUS EVERY 8 HOURS SCHEDULED
Status: DISCONTINUED | OUTPATIENT
Start: 2017-01-01 | End: 2017-01-01

## 2017-01-01 RX ORDER — ONDANSETRON 4 MG/1
4 TABLET, ORALLY DISINTEGRATING ORAL EVERY 8 HOURS PRN
Status: DISCONTINUED | OUTPATIENT
Start: 2017-01-01 | End: 2017-01-01

## 2017-01-01 RX ORDER — FLUTICASONE PROPIONATE 50 MCG
2 SPRAY, SUSPENSION (ML) NASAL DAILY
Status: DISCONTINUED | OUTPATIENT
Start: 2017-01-01 | End: 2017-01-01

## 2017-01-01 RX ORDER — LEVETIRACETAM 250 MG/1
250 TABLET ORAL 2 TIMES DAILY
Qty: 90 TABLET | Refills: 3 | Status: SHIPPED | OUTPATIENT
Start: 2017-01-01

## 2017-01-01 RX ORDER — SEVELAMER HYDROCHLORIDE 400 MG/1
800 TABLET, FILM COATED ORAL
Status: DISCONTINUED | OUTPATIENT
Start: 2017-01-01 | End: 2017-01-01

## 2017-01-01 RX ORDER — DOCUSATE SODIUM 100 MG/1
100 CAPSULE, LIQUID FILLED ORAL 2 TIMES DAILY
Status: DISCONTINUED | OUTPATIENT
Start: 2017-01-01 | End: 2017-01-01

## 2017-01-01 RX ORDER — METOPROLOL TARTRATE 50 MG/1
50 TABLET, FILM COATED ORAL EVERY OTHER DAY
Status: DISCONTINUED | OUTPATIENT
Start: 2017-01-01 | End: 2017-01-01

## 2017-01-01 RX ORDER — NYSTATIN 100000 U/G
OINTMENT TOPICAL DAILY
COMMUNITY

## 2017-01-01 RX ORDER — INSULIN LISPRO 100 [IU]/ML
INJECTION, SOLUTION INTRAVENOUS; SUBCUTANEOUS
COMMUNITY

## 2017-01-01 RX ORDER — FUROSEMIDE 40 MG/1
40 TABLET ORAL SEE ADMIN INSTRUCTIONS
Status: DISCONTINUED | OUTPATIENT
Start: 2017-01-01 | End: 2017-01-01

## 2017-01-01 RX ORDER — DIPHENHYDRAMINE HCL 25 MG
25 TABLET ORAL EVERY 6 HOURS PRN
COMMUNITY

## 2017-01-01 RX ORDER — TRAMADOL HYDROCHLORIDE 50 MG/1
50 TABLET ORAL EVERY 6 HOURS PRN
Status: DISCONTINUED | OUTPATIENT
Start: 2017-01-01 | End: 2017-01-01

## 2017-01-01 RX ORDER — ALPRAZOLAM 0.25 MG/1
0.25 TABLET ORAL 3 TIMES DAILY PRN
COMMUNITY

## 2017-01-01 RX ORDER — HEPARIN SODIUM 1000 [USP'U]/ML
1500 INJECTION, SOLUTION INTRAVENOUS; SUBCUTANEOUS ONCE
Status: COMPLETED | OUTPATIENT
Start: 2017-01-01 | End: 2017-01-01

## 2017-01-01 RX ORDER — HYDROMORPHONE HYDROCHLORIDE 1 MG/ML
0.4 INJECTION, SOLUTION INTRAMUSCULAR; INTRAVENOUS; SUBCUTANEOUS EVERY 2 HOUR PRN
Status: DISCONTINUED | OUTPATIENT
Start: 2017-01-01 | End: 2017-01-01

## 2017-01-01 RX ORDER — LEVOTHYROXINE SODIUM 0.05 MG/1
50 TABLET ORAL
Status: DISCONTINUED | OUTPATIENT
Start: 2017-01-01 | End: 2017-01-01

## 2017-01-01 RX ORDER — LEVETIRACETAM 250 MG/1
250 TABLET ORAL 2 TIMES DAILY
Status: DISCONTINUED | OUTPATIENT
Start: 2017-01-01 | End: 2017-01-01

## 2017-01-01 RX ORDER — LEVOTHYROXINE SODIUM 0.05 MG/1
50 TABLET ORAL
COMMUNITY

## 2017-01-01 RX ORDER — BISACODYL 10 MG
10 SUPPOSITORY, RECTAL RECTAL DAILY PRN
COMMUNITY

## 2017-01-01 RX ORDER — ALBUMIN (HUMAN) 12.5 G/50ML
SOLUTION INTRAVENOUS
Status: DISCONTINUED
Start: 2017-01-01 | End: 2017-01-01

## 2017-01-01 RX ORDER — GABAPENTIN 100 MG/1
CAPSULE ORAL
Qty: 40 CAPSULE | Refills: 0 | Status: SHIPPED | OUTPATIENT
Start: 2017-01-01 | End: 2017-01-01

## 2017-01-01 RX ORDER — METOPROLOL TARTRATE 50 MG/1
50 TABLET, FILM COATED ORAL
Status: DISCONTINUED | OUTPATIENT
Start: 2017-01-01 | End: 2017-01-01

## 2017-01-01 RX ORDER — BISACODYL 10 MG
10 SUPPOSITORY, RECTAL RECTAL DAILY
Status: DISCONTINUED | OUTPATIENT
Start: 2017-01-01 | End: 2017-01-01

## 2017-01-01 RX ORDER — CYCLOBENZAPRINE HCL 10 MG
10 TABLET ORAL 3 TIMES DAILY PRN
Status: DISCONTINUED | OUTPATIENT
Start: 2017-01-01 | End: 2017-01-01

## 2017-01-01 RX ORDER — HYDROMORPHONE HYDROCHLORIDE 1 MG/ML
0.5 INJECTION, SOLUTION INTRAMUSCULAR; INTRAVENOUS; SUBCUTANEOUS EVERY 30 MIN PRN
Status: DISCONTINUED | OUTPATIENT
Start: 2017-01-01 | End: 2017-01-01

## 2017-01-01 RX ORDER — PRAVASTATIN SODIUM 20 MG
20 TABLET ORAL NIGHTLY
Status: DISCONTINUED | OUTPATIENT
Start: 2017-01-01 | End: 2017-01-01

## 2017-01-01 RX ORDER — ONDANSETRON 2 MG/ML
4 INJECTION INTRAMUSCULAR; INTRAVENOUS EVERY 6 HOURS PRN
Status: DISCONTINUED | OUTPATIENT
Start: 2017-01-01 | End: 2017-01-01

## 2017-01-01 RX ORDER — ALBUTEROL SULFATE 2.5 MG/3ML
2.5 SOLUTION RESPIRATORY (INHALATION) EVERY 4 HOURS PRN
COMMUNITY

## 2017-01-01 RX ORDER — METOPROLOL TARTRATE 50 MG/1
TABLET, FILM COATED ORAL
Qty: 16 TABLET | Refills: 0 | Status: SHIPPED | OUTPATIENT
Start: 2017-01-01 | End: 2018-01-01

## 2017-01-01 RX ORDER — DEXTROSE MONOHYDRATE 25 G/50ML
50 INJECTION, SOLUTION INTRAVENOUS
Status: DISCONTINUED | OUTPATIENT
Start: 2017-01-01 | End: 2017-01-01

## 2017-01-01 RX ORDER — ALBUMIN (HUMAN) 12.5 G/50ML
25 SOLUTION INTRAVENOUS ONCE
Status: COMPLETED | OUTPATIENT
Start: 2017-01-01 | End: 2017-01-01

## 2017-01-01 RX ORDER — ALLOPURINOL 300 MG/1
TABLET ORAL EVERY 8 HOURS PRN
COMMUNITY
End: 2018-01-01

## 2017-01-24 PROBLEM — N18.30 TYPE 2 DIABETES MELLITUS WITH STAGE 3 CHRONIC KIDNEY DISEASE, WITH LONG-TERM CURRENT USE OF INSULIN (HCC): Status: ACTIVE | Noted: 2017-01-24

## 2017-01-24 PROBLEM — M17.0 OSTEOARTHRITIS OF BOTH KNEES, UNSPECIFIED OSTEOARTHRITIS TYPE: Status: ACTIVE | Noted: 2017-01-24

## 2017-01-24 PROBLEM — Z79.4 TYPE 2 DIABETES MELLITUS WITH STAGE 3 CHRONIC KIDNEY DISEASE, WITH LONG-TERM CURRENT USE OF INSULIN (HCC): Status: ACTIVE | Noted: 2017-01-24

## 2017-01-24 PROBLEM — E11.22 TYPE 2 DIABETES MELLITUS WITH STAGE 3 CHRONIC KIDNEY DISEASE, WITH LONG-TERM CURRENT USE OF INSULIN (HCC): Status: ACTIVE | Noted: 2017-01-24

## 2017-02-02 PROCEDURE — 81001 URINALYSIS AUTO W/SCOPE: CPT | Performed by: INTERNAL MEDICINE

## 2017-02-02 PROCEDURE — 82570 ASSAY OF URINE CREATININE: CPT | Performed by: INTERNAL MEDICINE

## 2017-02-02 PROCEDURE — 82565 ASSAY OF CREATININE: CPT | Performed by: INTERNAL MEDICINE

## 2017-02-02 PROCEDURE — 83970 ASSAY OF PARATHORMONE: CPT | Performed by: INTERNAL MEDICINE

## 2017-02-02 PROCEDURE — 80069 RENAL FUNCTION PANEL: CPT | Performed by: INTERNAL MEDICINE

## 2017-02-02 PROCEDURE — 84100 ASSAY OF PHOSPHORUS: CPT | Performed by: INTERNAL MEDICINE

## 2017-02-02 PROCEDURE — 82043 UR ALBUMIN QUANTITATIVE: CPT | Performed by: INTERNAL MEDICINE

## 2017-02-02 PROCEDURE — 84156 ASSAY OF PROTEIN URINE: CPT | Performed by: INTERNAL MEDICINE

## 2017-02-02 PROCEDURE — 82310 ASSAY OF CALCIUM: CPT | Performed by: INTERNAL MEDICINE

## 2017-02-09 PROCEDURE — 80069 RENAL FUNCTION PANEL: CPT | Performed by: INTERNAL MEDICINE

## 2017-02-09 PROCEDURE — 36415 COLL VENOUS BLD VENIPUNCTURE: CPT | Performed by: INTERNAL MEDICINE

## 2017-03-29 ENCOUNTER — NURSE ONLY (OUTPATIENT)
Dept: LAB | Age: 62
End: 2017-03-29
Attending: INTERNAL MEDICINE
Payer: COMMERCIAL

## 2017-03-29 DIAGNOSIS — N18.9 CKD (CHRONIC KIDNEY DISEASE): Primary | ICD-10-CM

## 2017-03-29 DIAGNOSIS — E11.9 DM (DIABETES MELLITUS) (HCC): ICD-10-CM

## 2017-03-29 LAB
ALBUMIN SERPL-MCNC: 2.2 G/DL (ref 3.5–4.8)
ALP LIVER SERPL-CCNC: 197 U/L (ref 46–118)
ALT SERPL-CCNC: 11 U/L (ref 14–54)
AST SERPL-CCNC: 7 U/L (ref 15–41)
BASOPHILS # BLD AUTO: 0.14 X10(3) UL (ref 0–0.1)
BASOPHILS NFR BLD AUTO: 1.1 %
BILIRUB SERPL-MCNC: 0.8 MG/DL (ref 0.1–2)
BUN BLD-MCNC: 28 MG/DL (ref 8–20)
CALCIUM BLD-MCNC: 8.8 MG/DL (ref 8.3–10.3)
CHLORIDE: 100 MMOL/L (ref 101–111)
CO2: 26 MMOL/L (ref 22–32)
CREAT BLD-MCNC: 4.67 MG/DL (ref 0.55–1.02)
EOSINOPHIL # BLD AUTO: 0.13 X10(3) UL (ref 0–0.3)
EOSINOPHIL NFR BLD AUTO: 1 %
ERYTHROCYTE [DISTWIDTH] IN BLOOD BY AUTOMATED COUNT: 17.6 % (ref 11.5–16)
GLUCOSE BLD-MCNC: 189 MG/DL (ref 70–99)
HCT VFR BLD AUTO: 29.1 % (ref 34–50)
HGB BLD-MCNC: 8.8 G/DL (ref 12–16)
IMMATURE GRANULOCYTE COUNT: 0.19 X10(3) UL (ref 0–1)
IMMATURE GRANULOCYTE RATIO %: 1.5 %
LYMPHOCYTES # BLD AUTO: 1.1 X10(3) UL (ref 0.9–4)
LYMPHOCYTES NFR BLD AUTO: 8.5 %
M PROTEIN MFR SERPL ELPH: 6.2 G/DL (ref 6.1–8.3)
MCH RBC QN AUTO: 29.8 PG (ref 27–33.2)
MCHC RBC AUTO-ENTMCNC: 30.2 G/DL (ref 31–37)
MCV RBC AUTO: 98.6 FL (ref 81–100)
MONOCYTES # BLD AUTO: 1.83 X10(3) UL (ref 0.1–0.6)
MONOCYTES NFR BLD AUTO: 14.1 %
NEUTROPHIL ABS PRELIM: 9.59 X10 (3) UL (ref 1.3–6.7)
NEUTROPHILS # BLD AUTO: 9.59 X10(3) UL (ref 1.3–6.7)
NEUTROPHILS NFR BLD AUTO: 73.8 %
PLATELET # BLD AUTO: 196 10(3)UL (ref 150–450)
POTASSIUM SERPL-SCNC: 3.8 MMOL/L (ref 3.6–5.1)
RBC # BLD AUTO: 2.95 X10(6)UL (ref 3.8–5.1)
RED CELL DISTRIBUTION WIDTH-SD: 58.4 FL (ref 35.1–46.3)
SODIUM SERPL-SCNC: 138 MMOL/L (ref 136–144)
WBC # BLD AUTO: 13 X10(3) UL (ref 4–13)

## 2017-03-29 PROCEDURE — 36415 COLL VENOUS BLD VENIPUNCTURE: CPT

## 2017-03-29 PROCEDURE — 80053 COMPREHEN METABOLIC PANEL: CPT

## 2017-03-29 PROCEDURE — 85025 COMPLETE CBC W/AUTO DIFF WBC: CPT

## 2017-03-31 PROBLEM — I95.9 HYPOTENSION, UNSPECIFIED HYPOTENSION TYPE: Status: ACTIVE | Noted: 2017-03-31

## 2017-03-31 PROBLEM — Z79.4 TYPE 2 DIABETES MELLITUS WITH OTHER DIABETIC KIDNEY COMPLICATION, WITH LONG-TERM CURRENT USE OF INSULIN (HCC): Status: ACTIVE | Noted: 2017-03-31

## 2017-03-31 PROBLEM — I50.33 ACUTE ON CHRONIC DIASTOLIC HEART FAILURE (HCC): Status: ACTIVE | Noted: 2017-03-31

## 2017-03-31 PROBLEM — E78.2 MIXED HYPERLIPIDEMIA: Status: ACTIVE | Noted: 2017-03-31

## 2017-03-31 PROBLEM — G20 PARKINSON'S DISEASE (HCC): Status: ACTIVE | Noted: 2017-03-31

## 2017-03-31 PROBLEM — E11.29 TYPE 2 DIABETES MELLITUS WITH OTHER DIABETIC KIDNEY COMPLICATION, WITH LONG-TERM CURRENT USE OF INSULIN (HCC): Status: ACTIVE | Noted: 2017-03-31

## 2017-03-31 PROBLEM — N18.6 ESRD ON HEMODIALYSIS (HCC): Status: ACTIVE | Noted: 2017-03-31

## 2017-03-31 PROBLEM — Z99.2 ESRD ON HEMODIALYSIS (HCC): Status: ACTIVE | Noted: 2017-03-31

## 2017-03-31 PROBLEM — E11.22 TYPE 2 DIABETES MELLITUS WITH STAGE 3 CHRONIC KIDNEY DISEASE, WITH LONG-TERM CURRENT USE OF INSULIN (HCC): Status: RESOLVED | Noted: 2017-01-24 | Resolved: 2017-03-31

## 2017-03-31 PROBLEM — N18.30 TYPE 2 DIABETES MELLITUS WITH STAGE 3 CHRONIC KIDNEY DISEASE, WITH LONG-TERM CURRENT USE OF INSULIN (HCC): Status: RESOLVED | Noted: 2017-01-24 | Resolved: 2017-03-31

## 2017-03-31 PROBLEM — Z09 HOSPITAL DISCHARGE FOLLOW-UP: Status: ACTIVE | Noted: 2017-03-31

## 2017-03-31 PROBLEM — Z79.4 TYPE 2 DIABETES MELLITUS WITH STAGE 3 CHRONIC KIDNEY DISEASE, WITH LONG-TERM CURRENT USE OF INSULIN (HCC): Status: RESOLVED | Noted: 2017-01-24 | Resolved: 2017-03-31

## 2017-03-31 PROBLEM — R56.9 SEIZURES (HCC): Status: ACTIVE | Noted: 2017-03-31

## 2017-03-31 PROBLEM — G47.33 OSA (OBSTRUCTIVE SLEEP APNEA): Status: ACTIVE | Noted: 2017-03-31

## 2017-04-03 ENCOUNTER — NURSE ONLY (OUTPATIENT)
Dept: LAB | Age: 62
End: 2017-04-03
Attending: INTERNAL MEDICINE
Payer: COMMERCIAL

## 2017-04-03 DIAGNOSIS — R53.1 WEAKNESS: Primary | ICD-10-CM

## 2017-04-03 LAB
ALBUMIN SERPL-MCNC: 2.1 G/DL (ref 3.5–4.8)
ALP LIVER SERPL-CCNC: 329 U/L (ref 46–118)
ALT SERPL-CCNC: 10 U/L (ref 14–54)
AST SERPL-CCNC: 14 U/L (ref 15–41)
BASOPHILS # BLD AUTO: 0.09 X10(3) UL (ref 0–0.1)
BASOPHILS NFR BLD AUTO: 1.2 %
BILIRUB SERPL-MCNC: 0.6 MG/DL (ref 0.1–2)
BUN BLD-MCNC: 46 MG/DL (ref 8–20)
CALCIUM BLD-MCNC: 8.8 MG/DL (ref 8.3–10.3)
CHLORIDE: 104 MMOL/L (ref 101–111)
CO2: 23 MMOL/L (ref 22–32)
CREAT BLD-MCNC: 6.07 MG/DL (ref 0.55–1.02)
EOSINOPHIL # BLD AUTO: 0.25 X10(3) UL (ref 0–0.3)
EOSINOPHIL NFR BLD AUTO: 3.2 %
ERYTHROCYTE [DISTWIDTH] IN BLOOD BY AUTOMATED COUNT: 16.4 % (ref 11.5–16)
GLUCOSE BLD-MCNC: 109 MG/DL (ref 70–99)
HCT VFR BLD AUTO: 27.7 % (ref 34–50)
HGB BLD-MCNC: 8.2 G/DL (ref 12–16)
IMMATURE GRANULOCYTE COUNT: 0.16 X10(3) UL (ref 0–1)
IMMATURE GRANULOCYTE RATIO %: 2.1 %
LYMPHOCYTES # BLD AUTO: 1.21 X10(3) UL (ref 0.9–4)
LYMPHOCYTES NFR BLD AUTO: 15.6 %
M PROTEIN MFR SERPL ELPH: 6.3 G/DL (ref 6.1–8.3)
MCH RBC QN AUTO: 29.5 PG (ref 27–33.2)
MCHC RBC AUTO-ENTMCNC: 29.6 G/DL (ref 31–37)
MCV RBC AUTO: 99.6 FL (ref 81–100)
MONOCYTES # BLD AUTO: 1.02 X10(3) UL (ref 0.1–0.6)
MONOCYTES NFR BLD AUTO: 13.2 %
NEUTROPHIL ABS PRELIM: 5.01 X10 (3) UL (ref 1.3–6.7)
NEUTROPHILS # BLD AUTO: 5.01 X10(3) UL (ref 1.3–6.7)
NEUTROPHILS NFR BLD AUTO: 64.7 %
PLATELET # BLD AUTO: 203 10(3)UL (ref 150–450)
POTASSIUM SERPL-SCNC: 4.3 MMOL/L (ref 3.6–5.1)
RBC # BLD AUTO: 2.78 X10(6)UL (ref 3.8–5.1)
RED CELL DISTRIBUTION WIDTH-SD: 58.5 FL (ref 35.1–46.3)
SODIUM SERPL-SCNC: 139 MMOL/L (ref 136–144)
WBC # BLD AUTO: 7.7 X10(3) UL (ref 4–13)

## 2017-04-03 PROCEDURE — 36415 COLL VENOUS BLD VENIPUNCTURE: CPT

## 2017-04-03 PROCEDURE — 85025 COMPLETE CBC W/AUTO DIFF WBC: CPT

## 2017-04-03 PROCEDURE — 80053 COMPREHEN METABOLIC PANEL: CPT

## 2017-04-12 PROBLEM — E11.22 TYPE 2 DIABETES MELLITUS WITH ESRD (END-STAGE RENAL DISEASE) (HCC): Status: ACTIVE | Noted: 2017-01-01

## 2017-04-12 PROBLEM — N18.6 TYPE 2 DIABETES MELLITUS WITH ESRD (END-STAGE RENAL DISEASE) (HCC): Status: ACTIVE | Noted: 2017-01-01

## 2017-06-19 NOTE — PROGRESS NOTES
Patient presented with phlebitis from blood draws in arm. I did an ultrasound and cephalic vein has non-occlusive clot in it.  Will cancel surgery for today and have her follow up in 2 weeks

## 2017-06-19 NOTE — PROGRESS NOTES
Dr. Natalee Golden did US on left arm because of blood draws and bruiding on left arm,  Will reschedule for AV fistula creation at another time. Called report to [facility] and instructed NO blood draws or IVs or BPs in left arm.   Pur a limb restriction arm band

## 2017-08-29 PROBLEM — D64.9 ANEMIA: Status: ACTIVE | Noted: 2017-01-01

## 2017-08-29 PROBLEM — R73.9 HYPERGLYCEMIA: Status: ACTIVE | Noted: 2017-01-01

## 2017-08-29 PROBLEM — M54.12 CERVICAL RADICULOPATHY: Status: ACTIVE | Noted: 2017-01-01

## 2017-08-29 PROBLEM — R52 INTRACTABLE PAIN: Status: ACTIVE | Noted: 2017-01-01

## 2017-08-29 NOTE — PROGRESS NOTES
[place] [facility] contacted again. Medication list fax sent earlier is not readable. Per [facility] they will send over new medication list to [facility].

## 2017-08-29 NOTE — H&P
[facility] Hospitalist History and Physical      Patient presents with:  Pain (neurologic)       PCP: Marina Nicole DO      History of Present Illness: Patient is a 65year old female with PMH sig for T2DM, ESRD on HD, HFpEF, HTN, JESSENIA and tremors (possible Parki DX/THER NEH      Comment: Procedure: LUMBAR EPIDURAL;  Surgeon:                Bria Tim MD;  Location: [place] [address]  No date: FRACTURE SURGERY  3/13/2014: INJECTION, W/WO CONTRAST, DX/THERAPEUTIC SUBST*      Comment: normal. No masses,  No organomegaly. Non distended   Extremities: No pitting edema noted on BLE   Skin: Skin color, texture, turgor normal. No rashes or lesions. Neurologic: Notable right upper extremity tremor.  Hand  slightly weaker on right side C3-4, and on the left at C7-T1. Osteophyte from the severe facet arthropathy on the left at C3-4 produces left-sided C3-4 foraminal stenosis, at least moderate degree.  Mild foraminal stenosis on the left seen at C5-6 and there is mild right-sided foramina protocol      Outpatient records or previous hospital records reviewed. [facility] hospitalist to continue to follow patient while in house  A total of 70 minutes taken with patient and coordinating care. Greater than 50% face to face encounter.       Anjelica Ochoa

## 2017-08-29 NOTE — PLAN OF CARE
Diabetes/Glucose Control    • Glucose maintained within prescribed range Progressing        DISCHARGE PLANNING    • Discharge to home or other facility with appropriate resources Progressing        NEUROLOGICAL - ADULT    • Achieves stable or improved neur

## 2017-08-29 NOTE — ED INITIAL ASSESSMENT (HPI)
C/o right upper arm, shoulder pain x2 weeks. Pt states pain is worsening. Pt was at dialysis today and shaking uncontrollably.   Hx parkinsons, kidney failure on dialysis

## 2017-08-29 NOTE — PROGRESS NOTES
NURSING ADMISSION NOTE      Patient admitted via Cart  Oriented to room. Safety precautions initiated. Bed in low position. Call light in reach. [place] [facility] contacted for Medication list. Pt updated on plan of care and verbalizes understanding.

## 2017-08-29 NOTE — ED PROVIDER NOTES
Patient Seen in: [facility] Emergency Department    History   Patient presents with:  Pain (neurologic)    Stated Complaint: chronic right upper arm/shoulder pain, here for pain pill    HPI    Patient is a 71-year-old female comes in to emergency ro Procedure: LUMBAR EPIDURAL;  Surgeon:                Lucía Jonas MD;  Location: [place] [address]  No date: FRACTURE SURGERY  3/13/2014: INJECTION, W/WO CONTRAST, DX/THERAPEUTIC SUBST*      Comment: Procedure: LUMBAR EPIDURA topically 2 (two) times daily. levetiracetam 1000 MG Oral Tab,  Take 1,000 mg by mouth. Linagliptin 5 MG Oral Tab,     Pravastatin Sodium 20 MG Oral Tab,  Take 1 tablet (20 mg total) by mouth nightly.    Omeprazole (PRILOSEC) 40 MG Oral Capsule Delayed and right trapezius tenderness. Lungs: Clear  Cardiovascular: Regular rate and rhythm, normal S1-S2  Abdominal: Soft, nontender, nondistended  Neurological: Decrease  strength right hand when compared to left. Right biceps strength 4/5.   She has Royal Maritza scanning of the cervical spine is performed from the skull base through C7. Multiplanar reconstructions are generated. Dose reduction techniques were used.  Dose information is transmitted to the [facility] of Radiology) Kalpana Comer 35 [facility] Radiolog Course  ------------------------------------------------------------  MDM     Patient is a 58-year-old female comes in emergency room for evaluation of right arm, shoulder neck pain. Suspect underlying cervical radiculopathy. Recommend MRI.   Case discuss

## 2017-08-30 NOTE — PROGRESS NOTES
[facility] Pharmacy Note:  Renal Dose Adjustment for Tramadol Jocelyn Oas)    Levan Canavan Krusiec has been prescribed Tramadol (ULTRAM) 50 mg orally every 6 hours as needed for pain. Estimated Creatinine Clearance: 19.7 mL/min (based on SCr of 3.26 mg/dL).  And on dialys

## 2017-08-30 NOTE — CONSULTS
[facility]  Report of Psychiatric Consultation    [address] Patient Status:  Observation    1955 MRN [id number]   [facility] 5NW-A Attending Cheryl Hodge MD   Hosp Day # 0 PCP Miguel Harris DO     Date of Admission:  8 bilateral facet degenerative disease most severe on the left C3-4. Moderate left C3 for foraminal stenosis, and mild bilateral C5-6 foraminal stenosis. \" Neurontin 100mg TID was started to treat possible radicular pain.      Neuro evaluated patient and note disorder ([facility] [address]    • Stroke [facility])     possible   • Unspecified sleep apnea PSG 4-18-12    AHI 10 [address] Sebastien 80%     Past Surgical History:  No date: ARTHROSCOPY OF JOINT UNLISTED  No date: CATARACT  3/13/2014: [address] NDL/CATH SPI DX/THER CFQ carbidopa-levodopa (SINEMET)  MG per tab 1 tablet, 1 tablet, Oral, TID  •  levETIRAcetam (KEPPRA) tab 250 mg, 250 mg, Oral, BID  •  gabapentin (NEURONTIN) cap 100 mg, 100 mg, Oral, TID  •  [START ON 8/31/2017] Midodrine HCl (PROAMATINE) tab 10 mg, 10 disintegrating tab 4 mg, 4 mg, Oral, Q8H PRN  •  Pravastatin Sodium (PRAVACHOL) tab 20 mg, 20 mg, Oral, Nightly  •  Sevelamer HCl (RENAGEL) tab 800 mg, 800 mg, Oral, TID CC  •  glucose (DEX4) oral liquid 15 g, 15 g, Oral, Q15 Min PRN **OR** Glucose-Vitamin 8/29/2017  PROCEDURE:  CT OF THE CERVICAL SPINE WITHOUT CONTRAST  COMPARISON:  None. INDICATIONS:  chronic right upper arm/shoulder pain. TECHNIQUE:  Noncontrast CT scanning of the cervical spine is performed from the skull base through C7.   Multiplanar

## 2017-08-30 NOTE — CONSULTS
[facility]  Report of Consultation    Wyman Cushing Patient Status:  Observation    1955 MRN [id number]   [facility] 5NW-A Attending Jonathan iMxon MD   Hosp Day # 0 PCP Star Mayes DO       REASON FOR CONSULT:     ESRD MANAGEMENT  No date: FRACTURE SURGERY  3/13/2014: INJECTION, W/WO CONTRAST, DX/THERAPEUTIC SUBST*      Comment: Procedure: LUMBAR EPIDURAL;  Surgeon:                Charyl Hatchet, MD;  Location: [place] [address] MANAGEMENT  3/31/2014:  I (DILAUDID) 1 MG/ML injection 0.8 mg, 0.8 mg, Intravenous, Q2H PRN  •  ondansetron HCl (ZOFRAN) injection 4 mg, 4 mg, Intravenous, Q6H PRN  •  TraMADol HCl (ULTRAM) tab 50 mg, 50 mg, Oral, Q6H PRN  •  acetaminophen (TYLENOL) tab 650 mg, 650 mg, Oral, Q8H MS current outpatient prescriptions on file.       PHYSICAL EXAM:     Vital Signs: /45 (BP Location: Right leg)   Pulse 75   Temp 98.3 °F (36.8 °C) (Oral)   Resp 18   Ht 4' 9.99\" (1.473 m)   Wt 152 lb 1.9 oz (69 kg)   SpO2 99%   BMI 31.80 kg/m²   Temp ( 08/30/2017   NE 5.63 08/30/2017   LYMABS 0.76 (L) 08/30/2017   MOABSO 0.30 08/30/2017   EOABSO 0.00 08/30/2017   BAABSO 0.01 08/30/2017       Lab Results  Component Value Date   Farhat Russell 156.00 02/02/2017   CREUR 81.80 02/02/2017   CREUR 80.20 02/02/2017 with concerns or questions. Patricia George, [facility]  [address]  [address] [facility], [address]    8/30/2017  9:47 AM

## 2017-08-30 NOTE — PAYOR COMM NOTE
--------------  ADMISSION REVIEW       8/29    ED       Patient presents with:  Pain      Stated Complaint: chronic right upper arm/shoulder pain, here for pain pill          Patient is a 59-year-old female comes in to emergency room for evaluation of righ Phosphatase 180 (*)       All other components within normal limits   CBC W/ DIFFERENTIAL - Abnormal; Notable for the following:      RBC 3.52 (*)       HGB 10.9 (*)       HCT 33.3 (*)       Neutrophil Absolute Prelim 7.72 (*)       Neutrophil Absolute 7.7

## 2017-08-30 NOTE — CONSULTS
[facility]    PATIENT'S NAME: Giancarlo Rosen   ATTENDING PHYSICIAN: Tammy Duarte. Michael Keene M.D.   [place] Pet[place]: Ck Ortiz M.D.    PATIENT ACCOUNT#:   [de-identified]    [facility]  MEDICAL RECORD #:   [id number]       DATE OF B status post epidural steroid injections, see chart for full details; retinal detachment status post repair.      MEDICATIONS:  Medications list at home includes insulin, nystatin, metoprolol, levothyroxine, Zofran, hydrocodone, midodrine, B complex, citalop pain radiating down her right arm consistent with cervical degenerative joint disease: We will start gabapentin 100 mg t.i.d. This should also help her cervical radiculopathy, and she will be getting physical therapy as well.     5.   Further recommendati

## 2017-08-30 NOTE — CONSULTS
Pt with recent outpt consult to neurology  Thought to have PD w rue rest tremor, bradykinesia and rigidity  EEG normal  Was aksed to dc keppra and start sinemet and these changes were not yet made  Pt here for same sx and RUE radicular pain  Exam gina

## 2017-08-30 NOTE — CM/SW NOTE
08/30/17 1500   CM/SW Referral Data   Referral Source Physician;Social Work (self-referral)   Reason for Referral Discharge planning;Psychoscial assessment   Informant Patient   Pertinent Medical Hx   Primary Care Physician Name Rebecca Lundborg   Patient Info   P and orientated per BEATRIZ Mccann and her own decision maker. RN updated on above. Patient stated she was anxious about her obs status vs. inpatient status.  MSW attempted to explain, CM AB present and stated she would work with patient to understand her status

## 2017-08-30 NOTE — PHYSICAL THERAPY NOTE
PT eval orders received, chart reviewed. Pt currently refusing PT due to fatigue. Encouraged pt and pt continues to refuse. Pt requests return later today. Will re-attempt as schedule allows.

## 2017-08-30 NOTE — PROGRESS NOTES
[facility] Hospitalist Progress Note     PCP: Raheem Dotson DO    Chief Complaint: follow-up    Overnight/Interim Events:      SUBJECTIVE:  Pt reports arm hurts. Burning in  area, even without urinating.  Upset about obs v inpt    OBJECTIVE:  Temp:  [98.2 °F 08/30/17   1647   PGLU  83  255*  139*  72  61*       No results for input(s): TROP in the last 72 hours.       Meds:     • furosemide  40 mg Oral See Admin Instructions   • carbidopa-levodopa  1 tablet Oral TID   • levetiracetam  250 mg Oral BID   • gabape with multilevel cervical spondylosis  -will obtain MRI C-spine  -s/p IV solumedrol in ED  -will try flexeril   -narcotics PRN, sparingly.   -PT/OT eval     ESRD on HD  -renal consulted  -continue outpatient meds     T2DM  -Hold orals  -Accuchecks  -SSI  -de

## 2017-08-30 NOTE — BH LEVEL OF CARE ASSESSMENT
Level of Care Assessment Note                    General Questions  Why are you here?: The pt states she came into the hospital due to having arm tremors, shakiness and pain.   History of Present Illness: 64 y/o female with a history of anxiety and depressi reported or observed  Delusions: Phobias  Describe Delusion: claustrophobia  Depression Symptoms: Crying; Feelings of helplessness; Feelings of hopelessess; Feelings of worthlessness;Sleep disturbance  Depression Description: states she has been depressed thr with Social Relationships: Yes  Describe Concerns/Conflicts with Social Relationships[de-identified] states she dont get to see her family as often as she would like to.   Decreased Functional Ability: Clean house;Cook;Driving;Grocery shop  Do you have any prior/current Functional Impairment  Type of Residence: Nursing home    Abuse Assessment  Does anyone say or do something to you that makes you feel unsafe?: No

## 2017-08-30 NOTE — CM/SW NOTE
Consult to [facility] for PHQ4, however patient seen by Talon Madden and will also be seen by Psychiatrist per [facility] note. MSW will continue to follow for d/c planning needs.

## 2017-08-30 NOTE — PLAN OF CARE
NEUROLOGICAL - ADULT    • Absence of seizures Progressing        PAIN - ADULT    • Verbalizes/displays adequate comfort level or patient's stated pain goal Progressing        Patient/Family Goals    • Patient/Family Long Term Goal Progressing    • Patient/

## 2017-08-30 NOTE — OCCUPATIONAL THERAPY NOTE
Attempted to see pt for OT. Pt stating she did not sleep well. Not agreeable to therapy at this time despite encouragement. Agreeable to re-attempt at another time. Second attempt to see pt.  Pt still sleeping, now stating she has been having nightmares

## 2017-08-30 NOTE — PHYSICAL THERAPY NOTE
Second attempt at PT eval. Pt continues to refuse due to fatigue despite encouragement. Pt requests PT return tomorrow. Will check back as schedule allows. RN aware.

## 2017-08-31 NOTE — PROGRESS NOTES
[facility]  Neurology  Progress Report    [address] Patient Status:  Observation    1955 MRN [id number]   [facility] 5NW-A Attending Marya Dos Santos MD   Hosp Day # 0 PCP Jose Roberto Heaton DO   Date of Admission:   JOINT UNLISTED  No date: CATARACT  3/13/2014: FLUOR GID & MELANIE NDL/CATH SPI DX/THER ULV      Comment: Procedure: LUMBAR EPIDURAL;  Surgeon:                Tito Altamirano MD;  Location:  [address] MANAGEMENT  3/31/2014: Ulysses GUARDADO 250 mg 250 mg Oral BID   gabapentin (NEURONTIN) cap 100 mg 100 mg Oral TID   Midodrine HCl (PROAMATINE) tab 10 mg 10 mg Oral Q48H   multivitamin (DIALYVITE - RENAL) tab 1 tablet 1 tablet Oral Daily   TraMADol HCl (ULTRAM) tab 50 mg 50 mg Oral Q12H PRN   He dextrose 50% injection 50 mL 50 mL Intravenous Q15 Min PRN   Or      glucose (DEX4) oral liquid 30 g 30 g Oral Q15 Min PRN   Or      Glucose-Vitamin C (DEX-4) 4-0.006 g chewable tab 8 tablet 8 tablet Oral Q15 Min PRN   Insulin Aspart Pen (NOVOLOG) 100 UN Oral Tab Take 0.25 mg by mouth 4 (four) times daily as needed for Sleep.   albuterol sulfate (2.5 MG/3ML) 0.083% Inhalation Nebu Soln Take 2.5 mg by nebulization every 6 (six) hours as needed for Wheezing.    DiphenhydrAMINE HCl 25 MG Oral Tab Take 25 mg by Pulse 66   Temp 99 °F (37.2 °C) (Oral)   Resp 18   Ht 4' 9.99\" (1.473 m)   Wt 152 lb 1.9 oz (69 kg)   SpO2 96%   BMI 31.80 kg/m²    General: The patient appears attentive, bright and well composed. Head: Normocephalic, atraumatic.   Eyes: anicteric  ENT: cervical spine, and bilateral facet degenerative disease most severe on the left C3-4. Moderate left C3 for foraminal stenosis, and mild bilateral C5-6 foraminal stenosis.    Dictated by: Estefanía Vasquez MD on 8/29/2017 at 12:31     Approved by: John Bateman patient.     Edy Ferrari Keesha  8/31/2017

## 2017-08-31 NOTE — CM/SW NOTE
10:20am  MSW spoke to Nyasia Potter from [facility] who states that patient will need new PASS screen. She states last screen is over 80days old and was for [facility] only. Since patient is now private pay LTC a new PASS is needed.      All clinicals faxed to [facility]

## 2017-08-31 NOTE — DISCHARGE SUMMARY
[facility] Internal Medicine Discharge Summary   Patient ID:  Vadim Alcazar  [id number]  79 year old  8/2/1955    Admit date: 8/29/2017    Discharge date and time: 8/31/2017     Attending Physician: Bib Ochoa MD     Primary Care Physician: Brittanie Moyer states her legs have \"felt tight\". She is requesting to see someone from psychiatry for anxiety, stating that she has anxiety attacks all the time. No current CP/SOB at time of my interview.       Hospital Course:   65year old female with PMH sig for T2D Medication List      START taking these medications    carbidopa-levodopa  MG Tabs  Commonly known as:  SINEMET  Take 1 tablet by mouth 3 (three) times daily.      gabapentin 100 MG Caps  Commonly known as:  NEURONTIN  Take 100 mg by mouth once a da 32G X 4 MM Misc  Commonly known as:  BD PEN NEEDLE ADRIA U/F  Use bid     Levothyroxine Sodium 50 MCG Tabs  Commonly known as:  SYNTHROID     Linagliptin 5 MG Tabs     Midodrine HCl 10 MG Tabs  Commonly known as:  PROAMATINE  To be taken 30-60 minutes befor cervical spine is performed from the skull base through C7. Multiplanar reconstructions are generated. Dose reduction techniques were used.  Dose information is transmitted to the [facility]) Kalp[address]er [address] ([address]) JOSE , CT SPINE CERVICAL (CPT=72125), 8/29/2017, 12:04. INDICATIONS:  Cervical radiculopathy. TECHNIQUE:  Multiplanar T1 and T2 weighted images including fat suppression sequences. Images acquired in sagittal and axial planes.    FINDINGS:  Motion deg foraminal narrowing is again seen at C3-C4. Multilevel advanced foraminal narrowing is noted. Ankylosis across the C6-C7 vertebral bodies and partial ankylosis across the C7-T1 disc space is again seen. Please see above for further details.   #2. Patchy T2

## 2017-08-31 NOTE — PAYOR COMM NOTE
--------------    8/30    CONTINUED STAY            Pt reports arm hurts.  Burning in  area, even without urinating.            Temp:  [98.2 °F (36.8 °C)-98.8 °F (37.1 °C)] 98.5 °F (36.9 °C)  Pulse:  [68-77] 70  Resp:  [18-20] 18  BP: (115146)/(43-64) 14

## 2017-08-31 NOTE — CM/SW NOTE
Spoke with pt's RN who stated pt can return to [facility] today. Spoke with Murali John, PAS screener who was able to complete PAS screen today. SW spoke with Lilly Fried from [facility] who confirmed pt can be accepted for readmission today.       Met with pt who is ag

## 2017-08-31 NOTE — PHYSICAL THERAPY NOTE
Attempted PT eval, as initiating eval, SW arrived and reports that pt has been cleared for d/c. Will d/c current PT order as pt is returning to SNF. Pt requests placement on restorative team at [facility] [place] for gait.  SW reports she will look into this with SNF staff

## 2017-08-31 NOTE — PROGRESS NOTES
Multidisciplinary Discharge Rounds held 8/31/2017. Treatment team members present today include , , Charge Nurse,  Nurse, RT, PT and Pharmacy caring for [employer]      Other care providers present:    Patient Active Problem

## 2017-08-31 NOTE — CM/SW NOTE
JOHN reviewed chart, Larry Quiver not needed to be obtained by MSW. Facility attempted to obtain auth and was denied. Patient will come back as private pay.  Patent was made aware yesterday with this writer that she would be private pay as she was \"obs\" st

## 2017-08-31 NOTE — PLAN OF CARE
Diabetes/Glucose Control    • Glucose maintained within prescribed range Adequate for Discharge        DISCHARGE PLANNING    • Discharge to home or other facility with appropriate resources Adequate for Discharge        NEUROLOGICAL - ADULT    • Achieves s

## 2017-09-01 NOTE — CM/SW NOTE
Patient discharged on 8/31/17 as previously planned.        09/01/17 0800   Discharge disposition   Discharged to: Skilled Nurs   Name of [address]   Discharge transportation Other (comment)  ([employer] [employer] per pt's request)

## 2018-01-01 ENCOUNTER — NURSE ONLY (OUTPATIENT)
Dept: LAB | Age: 63
End: 2018-01-01
Attending: INTERNAL MEDICINE
Payer: MEDICARE

## 2018-01-01 ENCOUNTER — APPOINTMENT (OUTPATIENT)
Dept: GENERAL RADIOLOGY | Facility: HOSPITAL | Age: 63
DRG: 871 | End: 2018-01-01
Attending: EMERGENCY MEDICINE
Payer: MEDICARE

## 2018-01-01 ENCOUNTER — APPOINTMENT (OUTPATIENT)
Dept: GENERAL RADIOLOGY | Facility: HOSPITAL | Age: 63
DRG: 296 | End: 2018-01-01
Payer: MEDICARE

## 2018-01-01 ENCOUNTER — APPOINTMENT (OUTPATIENT)
Dept: GENERAL RADIOLOGY | Facility: HOSPITAL | Age: 63
DRG: 296 | End: 2018-01-01
Attending: EMERGENCY MEDICINE
Payer: MEDICARE

## 2018-01-01 ENCOUNTER — APPOINTMENT (OUTPATIENT)
Dept: CV DIAGNOSTICS | Facility: HOSPITAL | Age: 63
DRG: 296 | End: 2018-01-01
Payer: MEDICARE

## 2018-01-01 ENCOUNTER — APPOINTMENT (OUTPATIENT)
Dept: GENERAL RADIOLOGY | Facility: HOSPITAL | Age: 63
DRG: 296 | End: 2018-01-01
Attending: ANESTHESIOLOGY
Payer: MEDICARE

## 2018-01-01 ENCOUNTER — HOSPITAL ENCOUNTER (OUTPATIENT)
Facility: HOSPITAL | Age: 63
LOS: 3 days | DRG: 296 | End: 2018-01-01
Attending: EMERGENCY MEDICINE | Admitting: INTERNAL MEDICINE
Payer: MEDICARE

## 2018-01-01 ENCOUNTER — APPOINTMENT (OUTPATIENT)
Dept: ULTRASOUND IMAGING | Facility: HOSPITAL | Age: 63
DRG: 296 | End: 2018-01-01
Payer: MEDICARE

## 2018-01-01 ENCOUNTER — APPOINTMENT (OUTPATIENT)
Dept: CT IMAGING | Facility: HOSPITAL | Age: 63
DRG: 296 | End: 2018-01-01
Attending: EMERGENCY MEDICINE
Payer: MEDICARE

## 2018-01-01 ENCOUNTER — APPOINTMENT (OUTPATIENT)
Dept: GENERAL RADIOLOGY | Facility: HOSPITAL | Age: 63
DRG: 871 | End: 2018-01-01
Attending: INTERNAL MEDICINE
Payer: MEDICARE

## 2018-01-01 ENCOUNTER — APPOINTMENT (OUTPATIENT)
Dept: GENERAL RADIOLOGY | Facility: HOSPITAL | Age: 63
DRG: 871 | End: 2018-01-01
Attending: NURSE PRACTITIONER
Payer: MEDICARE

## 2018-01-01 ENCOUNTER — HOSPITAL ENCOUNTER (INPATIENT)
Facility: HOSPITAL | Age: 63
LOS: 4 days | Discharge: NURSING, REHAB OR LONG-TERM CARE | DRG: 871 | End: 2018-01-01
Attending: EMERGENCY MEDICINE | Admitting: HOSPITALIST
Payer: MEDICARE

## 2018-01-01 VITALS
BODY MASS INDEX: 41.57 KG/M2 | OXYGEN SATURATION: 93 % | RESPIRATION RATE: 16 BRPM | SYSTOLIC BLOOD PRESSURE: 102 MMHG | HEART RATE: 88 BPM | TEMPERATURE: 99 F | HEIGHT: 57 IN | DIASTOLIC BLOOD PRESSURE: 60 MMHG | WEIGHT: 192.69 LBS

## 2018-01-01 VITALS
DIASTOLIC BLOOD PRESSURE: 52 MMHG | RESPIRATION RATE: 24 BRPM | SYSTOLIC BLOOD PRESSURE: 101 MMHG | WEIGHT: 192 LBS | OXYGEN SATURATION: 100 % | TEMPERATURE: 100 F | HEIGHT: 55 IN | BODY MASS INDEX: 44.43 KG/M2 | HEART RATE: 128 BPM

## 2018-01-01 DIAGNOSIS — N18.9 CKD (CHRONIC KIDNEY DISEASE): Primary | ICD-10-CM

## 2018-01-01 DIAGNOSIS — R05.9 COUGH: ICD-10-CM

## 2018-01-01 DIAGNOSIS — A41.9 SEPSIS DUE TO PNEUMONIA (HCC): Primary | ICD-10-CM

## 2018-01-01 DIAGNOSIS — I10 HTN (HYPERTENSION): Primary | ICD-10-CM

## 2018-01-01 DIAGNOSIS — E11.9 DM (DIABETES MELLITUS) (HCC): Primary | ICD-10-CM

## 2018-01-01 DIAGNOSIS — R69 DIAGNOSIS UNKNOWN: Primary | ICD-10-CM

## 2018-01-01 DIAGNOSIS — R50.9 FEVER: ICD-10-CM

## 2018-01-01 DIAGNOSIS — N18.9 CHRONIC KIDNEY DISEASE, UNSPECIFIED: Primary | ICD-10-CM

## 2018-01-01 DIAGNOSIS — L03.90 CELLULITIS: Primary | ICD-10-CM

## 2018-01-01 DIAGNOSIS — J18.9 SEPSIS DUE TO PNEUMONIA (HCC): Primary | ICD-10-CM

## 2018-01-01 DIAGNOSIS — N18.9 ACUTE RENAL FAILURE SUPERIMPOSED ON CHRONIC KIDNEY DISEASE, UNSPECIFIED CKD STAGE, UNSPECIFIED ACUTE RENAL FAILURE TYPE (HCC): ICD-10-CM

## 2018-01-01 DIAGNOSIS — R09.2 RESPIRATORY ARREST (HCC): Primary | ICD-10-CM

## 2018-01-01 DIAGNOSIS — N17.9 ACUTE RENAL FAILURE SUPERIMPOSED ON CHRONIC KIDNEY DISEASE, UNSPECIFIED CKD STAGE, UNSPECIFIED ACUTE RENAL FAILURE TYPE (HCC): ICD-10-CM

## 2018-01-01 DIAGNOSIS — E87.5 HYPERKALEMIA: ICD-10-CM

## 2018-01-01 DIAGNOSIS — R53.1 WEAKNESS: Primary | ICD-10-CM

## 2018-01-01 DIAGNOSIS — J44.9 COPD (CHRONIC OBSTRUCTIVE PULMONARY DISEASE) (HCC): Primary | ICD-10-CM

## 2018-01-01 DIAGNOSIS — J18.9 PNEUMONIA: Primary | ICD-10-CM

## 2018-01-01 LAB
ADENOVIRUS PCR:: NEGATIVE
ADENOVIRUS PCR:: NEGATIVE
ALBUMIN SERPL-MCNC: 2.5 G/DL (ref 3.5–4.8)
ALBUMIN SERPL-MCNC: 2.5 G/DL (ref 3.5–4.8)
ALBUMIN SERPL-MCNC: 2.6 G/DL (ref 3.5–4.8)
ALBUMIN SERPL-MCNC: 2.7 G/DL (ref 3.5–4.8)
ALBUMIN SERPL-MCNC: 2.7 G/DL (ref 3.5–4.8)
ALBUMIN SERPL-MCNC: 2.8 G/DL (ref 3.5–4.8)
ALBUMIN SERPL-MCNC: 2.9 G/DL (ref 3.5–4.8)
ALBUMIN SERPL-MCNC: 3.1 G/DL (ref 3.5–4.8)
ALBUMIN SERPL-MCNC: 3.2 G/DL (ref 3.5–4.8)
ALBUMIN SERPL-MCNC: 3.2 G/DL (ref 3.5–4.8)
ALBUMIN SERPL-MCNC: 3.3 G/DL (ref 3.5–4.8)
ALBUMIN SERPL-MCNC: 3.3 G/DL (ref 3.5–4.8)
ALBUMIN SERPL-MCNC: 3.4 G/DL (ref 3.5–4.8)
ALBUMIN SERPL-MCNC: 3.5 G/DL (ref 3.5–4.8)
ALBUMIN SERPL-MCNC: 3.5 G/DL (ref 3.5–4.8)
ALBUMIN SERPL-MCNC: 3.9 G/DL (ref 3.5–4.8)
ALBUMIN SERPL-MCNC: 3.9 G/DL (ref 3.5–4.8)
ALLENS TEST: POSITIVE
ALP LIVER SERPL-CCNC: 128 U/L (ref 50–130)
ALP LIVER SERPL-CCNC: 143 U/L (ref 50–130)
ALP LIVER SERPL-CCNC: 143 U/L (ref 50–130)
ALP LIVER SERPL-CCNC: 145 U/L (ref 50–130)
ALP LIVER SERPL-CCNC: 148 U/L (ref 50–130)
ALP LIVER SERPL-CCNC: 150 U/L (ref 50–130)
ALP LIVER SERPL-CCNC: 159 U/L (ref 50–130)
ALP LIVER SERPL-CCNC: 161 U/L (ref 50–130)
ALP LIVER SERPL-CCNC: 164 U/L (ref 50–130)
ALP LIVER SERPL-CCNC: 176 U/L (ref 50–130)
ALP LIVER SERPL-CCNC: 215 U/L (ref 50–130)
ALP LIVER SERPL-CCNC: 238 U/L (ref 50–130)
ALP LIVER SERPL-CCNC: 293 U/L (ref 50–130)
ALP LIVER SERPL-CCNC: 296 U/L (ref 50–130)
ALT SERPL-CCNC: 11 U/L (ref 14–54)
ALT SERPL-CCNC: 11 U/L (ref 14–54)
ALT SERPL-CCNC: 6 U/L (ref 14–54)
ALT SERPL-CCNC: 6 U/L (ref 14–54)
ALT SERPL-CCNC: 7 U/L (ref 14–54)
ALT SERPL-CCNC: 7 U/L (ref 14–54)
ALT SERPL-CCNC: 8 U/L (ref 14–54)
ALT SERPL-CCNC: 9 U/L (ref 14–54)
ALT SERPL-CCNC: 9 U/L (ref 14–54)
ALT SERPL-CCNC: <6 U/L (ref 14–54)
ARTERIAL BLD GAS O2 SATURATION: 92 % (ref 92–100)
ARTERIAL BLD GAS O2 SATURATION: 95 % (ref 92–100)
ARTERIAL BLD GAS O2 SATURATION: 98 % (ref 92–100)
ARTERIAL BLOOD GAS BASE EXCESS: -1.3
ARTERIAL BLOOD GAS BASE EXCESS: -1.6
ARTERIAL BLOOD GAS BASE EXCESS: 0.2
ARTERIAL BLOOD GAS HCO3: 25.5 MEQ/L (ref 22–26)
ARTERIAL BLOOD GAS HCO3: 25.6 MEQ/L (ref 22–26)
ARTERIAL BLOOD GAS HCO3: 26.4 MEQ/L (ref 22–26)
ARTERIAL BLOOD GAS PCO2: 50 MM HG (ref 35–45)
ARTERIAL BLOOD GAS PCO2: 53 MM HG (ref 35–45)
ARTERIAL BLOOD GAS PCO2: 56 MM HG (ref 35–45)
ARTERIAL BLOOD GAS PH: 7.28 (ref 7.35–7.45)
ARTERIAL BLOOD GAS PH: 7.3 (ref 7.35–7.45)
ARTERIAL BLOOD GAS PH: 7.34 (ref 7.35–7.45)
ARTERIAL BLOOD GAS PO2: 137 MM HG (ref 80–105)
ARTERIAL BLOOD GAS PO2: 77 MM HG (ref 80–105)
ARTERIAL BLOOD GAS PO2: 93 MM HG (ref 80–105)
AST SERPL-CCNC: 10 U/L (ref 15–41)
AST SERPL-CCNC: 10 U/L (ref 15–41)
AST SERPL-CCNC: 12 U/L (ref 15–41)
AST SERPL-CCNC: 12 U/L (ref 15–41)
AST SERPL-CCNC: 14 U/L (ref 15–41)
AST SERPL-CCNC: 16 U/L (ref 15–41)
AST SERPL-CCNC: 17 U/L (ref 15–41)
AST SERPL-CCNC: 18 U/L (ref 15–41)
AST SERPL-CCNC: 4 U/L (ref 15–41)
AST SERPL-CCNC: 7 U/L (ref 15–41)
AST SERPL-CCNC: 7 U/L (ref 15–41)
AST SERPL-CCNC: 8 U/L (ref 15–41)
AST SERPL-CCNC: 8 U/L (ref 15–41)
AST SERPL-CCNC: 9 U/L (ref 15–41)
ATRIAL RATE: 85 BPM
ATRIAL RATE: 89 BPM
B PERT DNA SPEC QL NAA+PROBE: NEGATIVE
B PERT DNA SPEC QL NAA+PROBE: NEGATIVE
BAND %: 1 %
BAND %: 2 %
BAND %: 2 %
BAND %: 6 %
BAND %: 7 %
BASOPHIL % MANUAL: 0 %
BASOPHIL % MANUAL: 1 %
BASOPHIL ABSOLUTE MANUAL: 0 X10(3) UL (ref 0–0.1)
BASOPHIL ABSOLUTE MANUAL: 0.19 X10(3) UL (ref 0–0.1)
BASOPHILS # BLD AUTO: 0.04 X10(3) UL (ref 0–0.1)
BASOPHILS # BLD AUTO: 0.04 X10(3) UL (ref 0–0.1)
BASOPHILS # BLD AUTO: 0.06 X10(3) UL (ref 0–0.1)
BASOPHILS # BLD AUTO: 0.07 X10(3) UL (ref 0–0.1)
BASOPHILS # BLD AUTO: 0.11 X10(3) UL (ref 0–0.1)
BASOPHILS # BLD AUTO: 0.15 X10(3) UL (ref 0–0.1)
BASOPHILS NFR BLD AUTO: 0.2 %
BASOPHILS NFR BLD AUTO: 0.2 %
BASOPHILS NFR BLD AUTO: 0.3 %
BASOPHILS NFR BLD AUTO: 0.5 %
BASOPHILS NFR BLD AUTO: 0.6 %
BASOPHILS NFR BLD AUTO: 0.8 %
BASOPHILS NFR BLD AUTO: 0.8 %
BASOPHILS NFR BLD AUTO: 1 %
BILIRUB SERPL-MCNC: 0.3 MG/DL (ref 0.1–2)
BILIRUB SERPL-MCNC: 0.4 MG/DL (ref 0.1–2)
BILIRUB SERPL-MCNC: 0.5 MG/DL (ref 0.1–2)
BILIRUB SERPL-MCNC: 0.6 MG/DL (ref 0.1–2)
BILIRUB SERPL-MCNC: 0.6 MG/DL (ref 0.1–2)
BILIRUB SERPL-MCNC: 0.7 MG/DL (ref 0.1–2)
BILIRUB SERPL-MCNC: 0.7 MG/DL (ref 0.1–2)
BILIRUB SERPL-MCNC: 0.8 MG/DL (ref 0.1–2)
BUN BLD-MCNC: 16 MG/DL (ref 8–20)
BUN BLD-MCNC: 19 MG/DL (ref 8–20)
BUN BLD-MCNC: 25 MG/DL (ref 8–20)
BUN BLD-MCNC: 29 MG/DL (ref 8–20)
BUN BLD-MCNC: 29 MG/DL (ref 8–20)
BUN BLD-MCNC: 31 MG/DL (ref 8–20)
BUN BLD-MCNC: 32 MG/DL (ref 8–20)
BUN BLD-MCNC: 33 MG/DL (ref 8–20)
BUN BLD-MCNC: 34 MG/DL (ref 8–20)
BUN BLD-MCNC: 36 MG/DL (ref 8–20)
BUN BLD-MCNC: 36 MG/DL (ref 8–20)
BUN BLD-MCNC: 37 MG/DL (ref 8–20)
BUN BLD-MCNC: 38 MG/DL (ref 8–20)
BUN BLD-MCNC: 40 MG/DL (ref 8–20)
BUN BLD-MCNC: 43 MG/DL (ref 8–20)
BUN BLD-MCNC: 44 MG/DL (ref 8–20)
BUN BLD-MCNC: 45 MG/DL (ref 8–20)
BUN BLD-MCNC: 47 MG/DL (ref 8–20)
BUN BLD-MCNC: 50 MG/DL (ref 8–20)
BUN BLD-MCNC: 53 MG/DL (ref 8–20)
BUN BLD-MCNC: 53 MG/DL (ref 8–20)
BUN BLD-MCNC: 63 MG/DL (ref 8–20)
BUN BLD-MCNC: 76 MG/DL (ref 8–20)
BUN BLD-MCNC: 9 MG/DL (ref 8–20)
C PNEUM DNA SPEC QL NAA+PROBE: NEGATIVE
C PNEUM DNA SPEC QL NAA+PROBE: NEGATIVE
CALCIUM BLD-MCNC: 8 MG/DL (ref 8.3–10.3)
CALCIUM BLD-MCNC: 8 MG/DL (ref 8.3–10.3)
CALCIUM BLD-MCNC: 8.1 MG/DL (ref 8.3–10.3)
CALCIUM BLD-MCNC: 8.3 MG/DL (ref 8.3–10.3)
CALCIUM BLD-MCNC: 8.5 MG/DL (ref 8.3–10.3)
CALCIUM BLD-MCNC: 8.6 MG/DL (ref 8.3–10.3)
CALCIUM BLD-MCNC: 8.6 MG/DL (ref 8.3–10.3)
CALCIUM BLD-MCNC: 8.8 MG/DL (ref 8.3–10.3)
CALCIUM BLD-MCNC: 8.9 MG/DL (ref 8.3–10.3)
CALCIUM BLD-MCNC: 9 MG/DL (ref 8.3–10.3)
CALCIUM BLD-MCNC: 9 MG/DL (ref 8.3–10.3)
CALCIUM BLD-MCNC: 9.1 MG/DL (ref 8.3–10.3)
CALCIUM BLD-MCNC: 9.3 MG/DL (ref 8.3–10.3)
CALCIUM BLD-MCNC: 9.4 MG/DL (ref 8.3–10.3)
CALCIUM BLD-MCNC: 9.6 MG/DL (ref 8.3–10.3)
CALCULATED O2 SATURATION: 93 % (ref 92–100)
CALCULATED O2 SATURATION: 97 % (ref 92–100)
CALCULATED O2 SATURATION: 99 % (ref 92–100)
CARBOXYHEMOGLOBIN: 0.1 % SAT (ref 0–3)
CARBOXYHEMOGLOBIN: 0.4 % SAT (ref 0–3)
CARBOXYHEMOGLOBIN: 0.7 % SAT (ref 0–3)
CHLORIDE: 100 MMOL/L (ref 101–111)
CHLORIDE: 101 MMOL/L (ref 101–111)
CHLORIDE: 102 MMOL/L (ref 101–111)
CHLORIDE: 104 MMOL/L (ref 101–111)
CHLORIDE: 104 MMOL/L (ref 101–111)
CHLORIDE: 105 MMOL/L (ref 101–111)
CHLORIDE: 105 MMOL/L (ref 101–111)
CHLORIDE: 106 MMOL/L (ref 101–111)
CHLORIDE: 106 MMOL/L (ref 101–111)
CHLORIDE: 95 MMOL/L (ref 101–111)
CHLORIDE: 96 MMOL/L (ref 101–111)
CHLORIDE: 98 MMOL/L (ref 101–111)
CHLORIDE: 99 MMOL/L (ref 101–111)
CO2: 22 MMOL/L (ref 22–32)
CO2: 23 MMOL/L (ref 22–32)
CO2: 24 MMOL/L (ref 22–32)
CO2: 25 MMOL/L (ref 22–32)
CO2: 26 MMOL/L (ref 22–32)
CO2: 26 MMOL/L (ref 22–32)
CO2: 27 MMOL/L (ref 22–32)
CO2: 28 MMOL/L (ref 22–32)
CO2: 29 MMOL/L (ref 22–32)
CO2: 29 MMOL/L (ref 22–32)
CO2: 30 MMOL/L (ref 22–32)
CO2: 32 MMOL/L (ref 22–32)
CORONAVIRUS 229E PCR:: NEGATIVE
CORONAVIRUS 229E PCR:: NEGATIVE
CORONAVIRUS HKU1 PCR:: NEGATIVE
CORONAVIRUS HKU1 PCR:: NEGATIVE
CORONAVIRUS NL63 PCR:: NEGATIVE
CORONAVIRUS NL63 PCR:: NEGATIVE
CORONAVIRUS OC43 PCR:: NEGATIVE
CORONAVIRUS OC43 PCR:: NEGATIVE
CREAT BLD-MCNC: 2.02 MG/DL (ref 0.55–1.02)
CREAT BLD-MCNC: 2.78 MG/DL (ref 0.55–1.02)
CREAT BLD-MCNC: 3.07 MG/DL (ref 0.55–1.02)
CREAT BLD-MCNC: 3.27 MG/DL (ref 0.55–1.02)
CREAT BLD-MCNC: 3.49 MG/DL (ref 0.55–1.02)
CREAT BLD-MCNC: 3.7 MG/DL (ref 0.55–1.02)
CREAT BLD-MCNC: 3.7 MG/DL (ref 0.55–1.02)
CREAT BLD-MCNC: 3.71 MG/DL (ref 0.55–1.02)
CREAT BLD-MCNC: 3.86 MG/DL (ref 0.55–1.02)
CREAT BLD-MCNC: 3.88 MG/DL (ref 0.55–1.02)
CREAT BLD-MCNC: 4.04 MG/DL (ref 0.55–1.02)
CREAT BLD-MCNC: 4.09 MG/DL (ref 0.55–1.02)
CREAT BLD-MCNC: 4.16 MG/DL (ref 0.55–1.02)
CREAT BLD-MCNC: 4.25 MG/DL (ref 0.55–1.02)
CREAT BLD-MCNC: 4.26 MG/DL (ref 0.55–1.02)
CREAT BLD-MCNC: 4.4 MG/DL (ref 0.55–1.02)
CREAT BLD-MCNC: 4.5 MG/DL (ref 0.55–1.02)
CREAT BLD-MCNC: 4.5 MG/DL (ref 0.55–1.02)
CREAT BLD-MCNC: 4.54 MG/DL (ref 0.55–1.02)
CREAT BLD-MCNC: 4.65 MG/DL (ref 0.55–1.02)
CREAT BLD-MCNC: 4.89 MG/DL (ref 0.55–1.02)
CREAT BLD-MCNC: 4.95 MG/DL (ref 0.55–1.02)
CREAT BLD-MCNC: 4.99 MG/DL (ref 0.55–1.02)
CREAT BLD-MCNC: 5.36 MG/DL (ref 0.55–1.02)
EOSINOPHIL # BLD AUTO: 0.03 X10(3) UL (ref 0–0.3)
EOSINOPHIL # BLD AUTO: 0.06 X10(3) UL (ref 0–0.3)
EOSINOPHIL # BLD AUTO: 0.09 X10(3) UL (ref 0–0.3)
EOSINOPHIL # BLD AUTO: 0.11 X10(3) UL (ref 0–0.3)
EOSINOPHIL # BLD AUTO: 0.13 X10(3) UL (ref 0–0.3)
EOSINOPHIL # BLD AUTO: 0.15 X10(3) UL (ref 0–0.3)
EOSINOPHIL # BLD AUTO: 0.26 X10(3) UL (ref 0–0.3)
EOSINOPHIL # BLD AUTO: 0.27 X10(3) UL (ref 0–0.3)
EOSINOPHIL # BLD AUTO: 0.29 X10(3) UL (ref 0–0.3)
EOSINOPHIL # BLD AUTO: 0.38 X10(3) UL (ref 0–0.3)
EOSINOPHIL % MANUAL: 0 %
EOSINOPHIL % MANUAL: 2 %
EOSINOPHIL % MANUAL: 3 %
EOSINOPHIL ABSOLUTE MANUAL: 0 X10(3) UL (ref 0–0.3)
EOSINOPHIL ABSOLUTE MANUAL: 0.32 X10(3) UL (ref 0–0.3)
EOSINOPHIL ABSOLUTE MANUAL: 0.56 X10(3) UL (ref 0–0.3)
EOSINOPHIL NFR BLD AUTO: 0.1 %
EOSINOPHIL NFR BLD AUTO: 0.5 %
EOSINOPHIL NFR BLD AUTO: 0.7 %
EOSINOPHIL NFR BLD AUTO: 0.8 %
EOSINOPHIL NFR BLD AUTO: 0.9 %
EOSINOPHIL NFR BLD AUTO: 1.4 %
EOSINOPHIL NFR BLD AUTO: 1.5 %
EOSINOPHIL NFR BLD AUTO: 1.8 %
EOSINOPHIL NFR BLD AUTO: 3.1 %
EOSINOPHIL NFR BLD AUTO: 4.1 %
ERYTHROCYTE [DISTWIDTH] IN BLOOD BY AUTOMATED COUNT: 14.1 % (ref 11.5–16)
ERYTHROCYTE [DISTWIDTH] IN BLOOD BY AUTOMATED COUNT: 14.4 % (ref 11.5–16)
ERYTHROCYTE [DISTWIDTH] IN BLOOD BY AUTOMATED COUNT: 14.4 % (ref 11.5–16)
ERYTHROCYTE [DISTWIDTH] IN BLOOD BY AUTOMATED COUNT: 14.5 % (ref 11.5–16)
ERYTHROCYTE [DISTWIDTH] IN BLOOD BY AUTOMATED COUNT: 14.6 % (ref 11.5–16)
ERYTHROCYTE [DISTWIDTH] IN BLOOD BY AUTOMATED COUNT: 15.1 % (ref 11.5–16)
ERYTHROCYTE [DISTWIDTH] IN BLOOD BY AUTOMATED COUNT: 16.3 % (ref 11.5–16)
ERYTHROCYTE [DISTWIDTH] IN BLOOD BY AUTOMATED COUNT: 16.5 % (ref 11.5–16)
ERYTHROCYTE [DISTWIDTH] IN BLOOD BY AUTOMATED COUNT: 16.5 % (ref 11.5–16)
ERYTHROCYTE [DISTWIDTH] IN BLOOD BY AUTOMATED COUNT: 16.6 % (ref 11.5–16)
ERYTHROCYTE [DISTWIDTH] IN BLOOD BY AUTOMATED COUNT: 16.7 % (ref 11.5–16)
ERYTHROCYTE [DISTWIDTH] IN BLOOD BY AUTOMATED COUNT: 16.7 % (ref 11.5–16)
ERYTHROCYTE [DISTWIDTH] IN BLOOD BY AUTOMATED COUNT: 16.8 % (ref 11.5–16)
EST. AVERAGE GLUCOSE BLD GHB EST-MCNC: 143 MG/DL (ref 68–126)
EXPIRATORY PRESSURE: 5 CM H2O
FIO2: 40 %
FIO2: 50 %
FLUAV H3 RNA SPEC QL NAA+PROBE: POSITIVE
FLUAV RNA SPEC QL NAA+PROBE: NEGATIVE
FLUBV RNA SPEC QL NAA+PROBE: NEGATIVE
FLUBV RNA SPEC QL NAA+PROBE: NEGATIVE
GLUCOSE BLD-MCNC: 102 MG/DL (ref 65–99)
GLUCOSE BLD-MCNC: 104 MG/DL (ref 70–99)
GLUCOSE BLD-MCNC: 105 MG/DL (ref 70–99)
GLUCOSE BLD-MCNC: 108 MG/DL (ref 70–99)
GLUCOSE BLD-MCNC: 114 MG/DL (ref 70–99)
GLUCOSE BLD-MCNC: 118 MG/DL (ref 65–99)
GLUCOSE BLD-MCNC: 122 MG/DL (ref 70–99)
GLUCOSE BLD-MCNC: 123 MG/DL (ref 65–99)
GLUCOSE BLD-MCNC: 123 MG/DL (ref 65–99)
GLUCOSE BLD-MCNC: 124 MG/DL (ref 65–99)
GLUCOSE BLD-MCNC: 127 MG/DL (ref 65–99)
GLUCOSE BLD-MCNC: 132 MG/DL (ref 70–99)
GLUCOSE BLD-MCNC: 134 MG/DL (ref 65–99)
GLUCOSE BLD-MCNC: 134 MG/DL (ref 65–99)
GLUCOSE BLD-MCNC: 137 MG/DL (ref 65–99)
GLUCOSE BLD-MCNC: 137 MG/DL (ref 70–99)
GLUCOSE BLD-MCNC: 146 MG/DL (ref 65–99)
GLUCOSE BLD-MCNC: 147 MG/DL (ref 65–99)
GLUCOSE BLD-MCNC: 148 MG/DL (ref 70–99)
GLUCOSE BLD-MCNC: 150 MG/DL (ref 70–99)
GLUCOSE BLD-MCNC: 155 MG/DL (ref 65–99)
GLUCOSE BLD-MCNC: 161 MG/DL (ref 65–99)
GLUCOSE BLD-MCNC: 167 MG/DL (ref 65–99)
GLUCOSE BLD-MCNC: 175 MG/DL (ref 65–99)
GLUCOSE BLD-MCNC: 179 MG/DL (ref 65–99)
GLUCOSE BLD-MCNC: 191 MG/DL (ref 65–99)
GLUCOSE BLD-MCNC: 194 MG/DL (ref 70–99)
GLUCOSE BLD-MCNC: 203 MG/DL (ref 70–99)
GLUCOSE BLD-MCNC: 204 MG/DL (ref 65–99)
GLUCOSE BLD-MCNC: 217 MG/DL (ref 65–99)
GLUCOSE BLD-MCNC: 258 MG/DL (ref 65–99)
GLUCOSE BLD-MCNC: 264 MG/DL (ref 65–99)
GLUCOSE BLD-MCNC: 286 MG/DL (ref 70–99)
GLUCOSE BLD-MCNC: 293 MG/DL (ref 70–99)
GLUCOSE BLD-MCNC: 48 MG/DL (ref 65–99)
GLUCOSE BLD-MCNC: 53 MG/DL (ref 65–99)
GLUCOSE BLD-MCNC: 56 MG/DL (ref 65–99)
GLUCOSE BLD-MCNC: 61 MG/DL (ref 65–99)
GLUCOSE BLD-MCNC: 62 MG/DL (ref 70–99)
GLUCOSE BLD-MCNC: 67 MG/DL (ref 65–99)
GLUCOSE BLD-MCNC: 71 MG/DL (ref 70–99)
GLUCOSE BLD-MCNC: 71 MG/DL (ref 70–99)
GLUCOSE BLD-MCNC: 74 MG/DL (ref 70–99)
GLUCOSE BLD-MCNC: 77 MG/DL (ref 70–99)
GLUCOSE BLD-MCNC: 84 MG/DL (ref 70–99)
GLUCOSE BLD-MCNC: 85 MG/DL (ref 70–99)
GLUCOSE BLD-MCNC: 88 MG/DL (ref 65–99)
GLUCOSE BLD-MCNC: 91 MG/DL (ref 70–99)
GLUCOSE BLD-MCNC: 92 MG/DL (ref 65–99)
GLUCOSE BLD-MCNC: 93 MG/DL (ref 65–99)
GLUCOSE BLD-MCNC: 95 MG/DL (ref 70–99)
GLUCOSE BLD-MCNC: 96 MG/DL (ref 70–99)
GLUCOSE BLD-MCNC: 99 MG/DL (ref 70–99)
HAV IGM SER QL: 2 MG/DL (ref 1.7–3)
HAV IGM SER QL: 2.1 MG/DL (ref 1.7–3)
HAV IGM SER QL: 2.2 MG/DL (ref 1.7–3)
HAV IGM SER QL: 2.2 MG/DL (ref 1.7–3)
HAV IGM SER QL: 2.3 MG/DL (ref 1.7–3)
HBA1C MFR BLD HPLC: 6.6 % (ref ?–5.7)
HCT VFR BLD AUTO: 27.8 % (ref 34–50)
HCT VFR BLD AUTO: 28.5 % (ref 34–50)
HCT VFR BLD AUTO: 29.4 % (ref 34–50)
HCT VFR BLD AUTO: 30 % (ref 34–50)
HCT VFR BLD AUTO: 30 % (ref 34–50)
HCT VFR BLD AUTO: 30.2 % (ref 34–50)
HCT VFR BLD AUTO: 32 % (ref 34–50)
HCT VFR BLD AUTO: 33.3 % (ref 34–50)
HCT VFR BLD AUTO: 33.6 % (ref 34–50)
HCT VFR BLD AUTO: 34.5 % (ref 34–50)
HCT VFR BLD AUTO: 35.9 % (ref 34–50)
HCT VFR BLD AUTO: 37.1 % (ref 34–50)
HCT VFR BLD AUTO: 38.3 % (ref 34–50)
HCT VFR BLD AUTO: 41.4 % (ref 34–50)
HCT VFR BLD AUTO: 42.1 % (ref 34–50)
HGB BLD-MCNC: 10 G/DL (ref 12–16)
HGB BLD-MCNC: 10.2 G/DL (ref 12–16)
HGB BLD-MCNC: 10.2 G/DL (ref 12–16)
HGB BLD-MCNC: 11.1 G/DL (ref 12–16)
HGB BLD-MCNC: 11.4 G/DL (ref 12–16)
HGB BLD-MCNC: 11.6 G/DL (ref 12–16)
HGB BLD-MCNC: 13 G/DL (ref 12–16)
HGB BLD-MCNC: 13 G/DL (ref 12–16)
HGB BLD-MCNC: 8.2 G/DL (ref 12–16)
HGB BLD-MCNC: 8.7 G/DL (ref 12–16)
HGB BLD-MCNC: 9 G/DL (ref 12–16)
HGB BLD-MCNC: 9 G/DL (ref 12–16)
HGB BLD-MCNC: 9.1 G/DL (ref 12–16)
HGB BLD-MCNC: 9.1 G/DL (ref 12–16)
HGB BLD-MCNC: 9.8 G/DL (ref 12–16)
IMMATURE GRANULOCYTE COUNT: 0.04 X10(3) UL (ref 0–1)
IMMATURE GRANULOCYTE COUNT: 0.05 X10(3) UL (ref 0–1)
IMMATURE GRANULOCYTE COUNT: 0.09 X10(3) UL (ref 0–1)
IMMATURE GRANULOCYTE COUNT: 0.22 X10(3) UL (ref 0–1)
IMMATURE GRANULOCYTE COUNT: 0.23 X10(3) UL (ref 0–1)
IMMATURE GRANULOCYTE COUNT: 0.36 X10(3) UL (ref 0–1)
IMMATURE GRANULOCYTE COUNT: 0.37 X10(3) UL (ref 0–1)
IMMATURE GRANULOCYTE COUNT: 0.43 X10(3) UL (ref 0–1)
IMMATURE GRANULOCYTE COUNT: 0.47 X10(3) UL (ref 0–1)
IMMATURE GRANULOCYTE COUNT: 0.5 X10(3) UL (ref 0–1)
IMMATURE GRANULOCYTE RATIO %: 0.5 %
IMMATURE GRANULOCYTE RATIO %: 0.6 %
IMMATURE GRANULOCYTE RATIO %: 1 %
IMMATURE GRANULOCYTE RATIO %: 1.4 %
IMMATURE GRANULOCYTE RATIO %: 1.5 %
IMMATURE GRANULOCYTE RATIO %: 2 %
IMMATURE GRANULOCYTE RATIO %: 2 %
IMMATURE GRANULOCYTE RATIO %: 2.6 %
IMMATURE GRANULOCYTE RATIO %: 2.9 %
IMMATURE GRANULOCYTE RATIO %: 3.6 %
IONIZED CALCIUM: 1.2 MMOL/L (ref 1.12–1.32)
IONIZED CALCIUM: 1.21 MMOL/L (ref 1.12–1.32)
L/M: 2 L/MIN
LACTIC ACID ARTERIAL: 1.5 MMOL/L (ref 0.5–2)
LACTIC ACID ARTERIAL: <1.3 MMOL/L (ref 0.5–2)
LACTIC ACID: 1.6 MMOL/L (ref 0.5–2)
LACTIC ACID: 1.9 MMOL/L (ref 0.5–2)
LYMPHOCYTE % MANUAL: 12 %
LYMPHOCYTE % MANUAL: 13 %
LYMPHOCYTE % MANUAL: 20 %
LYMPHOCYTE % MANUAL: 23 %
LYMPHOCYTE % MANUAL: 25 %
LYMPHOCYTE ABSOLUTE MANUAL: 2.58 X10(3) UL (ref 0.9–4)
LYMPHOCYTE ABSOLUTE MANUAL: 2.65 X10(3) UL (ref 0.9–4)
LYMPHOCYTE ABSOLUTE MANUAL: 3.66 X10(3) UL (ref 0.9–4)
LYMPHOCYTE ABSOLUTE MANUAL: 3.7 X10(3) UL (ref 0.9–4)
LYMPHOCYTE ABSOLUTE MANUAL: 4 X10(3) UL (ref 0.9–4)
LYMPHOCYTES # BLD AUTO: 0.83 X10(3) UL (ref 0.9–4)
LYMPHOCYTES # BLD AUTO: 1.11 X10(3) UL (ref 0.9–4)
LYMPHOCYTES # BLD AUTO: 1.27 X10(3) UL (ref 0.9–4)
LYMPHOCYTES # BLD AUTO: 1.62 X10(3) UL (ref 0.9–4)
LYMPHOCYTES # BLD AUTO: 1.86 X10(3) UL (ref 0.9–4)
LYMPHOCYTES # BLD AUTO: 1.97 X10(3) UL (ref 0.9–4)
LYMPHOCYTES # BLD AUTO: 2.1 X10(3) UL (ref 0.9–4)
LYMPHOCYTES # BLD AUTO: 2.15 X10(3) UL (ref 0.9–4)
LYMPHOCYTES # BLD AUTO: 2.66 X10(3) UL (ref 0.9–4)
LYMPHOCYTES # BLD AUTO: 3.47 X10(3) UL (ref 0.9–4)
LYMPHOCYTES NFR BLD AUTO: 11.6 %
LYMPHOCYTES NFR BLD AUTO: 11.9 %
LYMPHOCYTES NFR BLD AUTO: 14.1 %
LYMPHOCYTES NFR BLD AUTO: 17.6 %
LYMPHOCYTES NFR BLD AUTO: 19.7 %
LYMPHOCYTES NFR BLD AUTO: 21.1 %
LYMPHOCYTES NFR BLD AUTO: 23.1 %
LYMPHOCYTES NFR BLD AUTO: 6.6 %
LYMPHOCYTES NFR BLD AUTO: 8.3 %
LYMPHOCYTES NFR BLD AUTO: 8.6 %
M PROTEIN MFR SERPL ELPH: 6 G/DL (ref 6.1–8.3)
M PROTEIN MFR SERPL ELPH: 6.3 G/DL (ref 6.1–8.3)
M PROTEIN MFR SERPL ELPH: 6.4 G/DL (ref 6.1–8.3)
M PROTEIN MFR SERPL ELPH: 6.6 G/DL (ref 6.1–8.3)
M PROTEIN MFR SERPL ELPH: 6.6 G/DL (ref 6.1–8.3)
M PROTEIN MFR SERPL ELPH: 6.7 G/DL (ref 6.1–8.3)
M PROTEIN MFR SERPL ELPH: 6.7 G/DL (ref 6.1–8.3)
M PROTEIN MFR SERPL ELPH: 6.8 G/DL (ref 6.1–8.3)
M PROTEIN MFR SERPL ELPH: 6.9 G/DL (ref 6.1–8.3)
M PROTEIN MFR SERPL ELPH: 7 G/DL (ref 6.1–8.3)
M PROTEIN MFR SERPL ELPH: 7 G/DL (ref 6.1–8.3)
M PROTEIN MFR SERPL ELPH: 7.5 G/DL (ref 6.1–8.3)
M PROTEIN MFR SERPL ELPH: 7.6 G/DL (ref 6.1–8.3)
M PROTEIN MFR SERPL ELPH: 8.2 G/DL (ref 6.1–8.3)
MCH RBC QN AUTO: 32.2 PG (ref 27–33.2)
MCH RBC QN AUTO: 32.3 PG (ref 27–33.2)
MCH RBC QN AUTO: 32.3 PG (ref 27–33.2)
MCH RBC QN AUTO: 32.4 PG (ref 27–33.2)
MCH RBC QN AUTO: 32.4 PG (ref 27–33.2)
MCH RBC QN AUTO: 32.6 PG (ref 27–33.2)
MCH RBC QN AUTO: 32.9 PG (ref 27–33.2)
MCH RBC QN AUTO: 32.9 PG (ref 27–33.2)
MCH RBC QN AUTO: 33.1 PG (ref 27–33.2)
MCH RBC QN AUTO: 33.2 PG (ref 27–33.2)
MCH RBC QN AUTO: 33.5 PG (ref 27–33.2)
MCH RBC QN AUTO: 33.6 PG (ref 27–33.2)
MCH RBC QN AUTO: 33.7 PG (ref 27–33.2)
MCH RBC QN AUTO: 33.8 PG (ref 27–33.2)
MCH RBC QN AUTO: 33.9 PG (ref 27–33.2)
MCHC RBC AUTO-ENTMCNC: 29 G/DL (ref 31–37)
MCHC RBC AUTO-ENTMCNC: 29.5 G/DL (ref 31–37)
MCHC RBC AUTO-ENTMCNC: 29.8 G/DL (ref 31–37)
MCHC RBC AUTO-ENTMCNC: 30.3 G/DL (ref 31–37)
MCHC RBC AUTO-ENTMCNC: 30.4 G/DL (ref 31–37)
MCHC RBC AUTO-ENTMCNC: 30.5 G/DL (ref 31–37)
MCHC RBC AUTO-ENTMCNC: 30.6 G/DL (ref 31–37)
MCHC RBC AUTO-ENTMCNC: 30.7 G/DL (ref 31–37)
MCHC RBC AUTO-ENTMCNC: 30.9 G/DL (ref 31–37)
MCHC RBC AUTO-ENTMCNC: 30.9 G/DL (ref 31–37)
MCHC RBC AUTO-ENTMCNC: 31.4 G/DL (ref 31–37)
MCV RBC AUTO: 103.2 FL (ref 81–100)
MCV RBC AUTO: 104.1 FL (ref 81–100)
MCV RBC AUTO: 104.5 FL (ref 81–100)
MCV RBC AUTO: 105.4 FL (ref 81–100)
MCV RBC AUTO: 106.3 FL (ref 81–100)
MCV RBC AUTO: 106.3 FL (ref 81–100)
MCV RBC AUTO: 107.4 FL (ref 81–100)
MCV RBC AUTO: 108.5 FL (ref 81–100)
MCV RBC AUTO: 110.3 FL (ref 81–100)
MCV RBC AUTO: 110.5 FL (ref 81–100)
MCV RBC AUTO: 110.9 FL (ref 81–100)
MCV RBC AUTO: 111.1 FL (ref 81–100)
MCV RBC AUTO: 111.4 FL (ref 81–100)
MCV RBC AUTO: 112.1 FL (ref 81–100)
MCV RBC AUTO: 115.8 FL (ref 81–100)
METAMYELOCYTE %: 1 %
METAMYELOCYTE %: 2 %
METAMYELOCYTE %: 3 %
METAMYELOCYTE ABSOLUTE MANUAL: 0.22 X10(3) UL (ref ?–0.01)
METAMYELOCYTE ABSOLUTE MANUAL: 0.37 X10(3) UL (ref ?–0.01)
METAMYELOCYTE ABSOLUTE MANUAL: 0.48 X10(3) UL (ref ?–0.01)
METAPNEUMOVIRUS PCR:: NEGATIVE
METAPNEUMOVIRUS PCR:: NEGATIVE
METHEMOGLOBIN: 0 % SAT (ref 0.4–1.5)
METHEMOGLOBIN: 0.1 % SAT (ref 0.4–1.5)
METHEMOGLOBIN: 0.1 % SAT (ref 0.4–1.5)
MONOCYTE % MANUAL: 10 %
MONOCYTE % MANUAL: 13 %
MONOCYTE % MANUAL: 3 %
MONOCYTE % MANUAL: 3 %
MONOCYTE % MANUAL: 5 %
MONOCYTE ABSOLUTE MANUAL: 0.48 X10(3) UL (ref 0.1–1)
MONOCYTE ABSOLUTE MANUAL: 0.61 X10(3) UL (ref 0.1–1)
MONOCYTE ABSOLUTE MANUAL: 0.93 X10(3) UL (ref 0.1–1)
MONOCYTE ABSOLUTE MANUAL: 1.6 X10(3) UL (ref 0.1–1)
MONOCYTE ABSOLUTE MANUAL: 2.8 X10(3) UL (ref 0.1–0.6)
MONOCYTES # BLD AUTO: 1.03 X10(3) UL (ref 0.1–0.6)
MONOCYTES # BLD AUTO: 1.06 X10(3) UL (ref 0.1–1)
MONOCYTES # BLD AUTO: 1.12 X10(3) UL (ref 0.1–0.6)
MONOCYTES # BLD AUTO: 1.12 X10(3) UL (ref 0.1–1)
MONOCYTES # BLD AUTO: 1.17 X10(3) UL (ref 0.1–1)
MONOCYTES # BLD AUTO: 1.29 X10(3) UL (ref 0.1–1)
MONOCYTES # BLD AUTO: 1.45 X10(3) UL (ref 0.1–1)
MONOCYTES # BLD AUTO: 1.53 X10(3) UL (ref 0.1–0.6)
MONOCYTES # BLD AUTO: 1.66 X10(3) UL (ref 0.1–1)
MONOCYTES # BLD AUTO: 1.81 X10(3) UL (ref 0.1–0.6)
MONOCYTES NFR BLD AUTO: 11.2 %
MONOCYTES NFR BLD AUTO: 11.5 %
MONOCYTES NFR BLD AUTO: 11.9 %
MONOCYTES NFR BLD AUTO: 21.4 %
MONOCYTES NFR BLD AUTO: 7.2 %
MONOCYTES NFR BLD AUTO: 7.3 %
MONOCYTES NFR BLD AUTO: 7.4 %
MONOCYTES NFR BLD AUTO: 7.6 %
MONOCYTES NFR BLD AUTO: 7.7 %
MONOCYTES NFR BLD AUTO: 9.8 %
MORPHOLOGY: NORMAL
MORPHOLOGY: NORMAL
MYCOPLASMA PNEUMONIA PCR:: NEGATIVE
MYCOPLASMA PNEUMONIA PCR:: NEGATIVE
MYELOCYTE %: 1 %
MYELOCYTE %: 3 %
MYELOCYTE ABSOLUTE MANUAL: 0.2 X10(3) UL (ref ?–0.01)
MYELOCYTE ABSOLUTE MANUAL: 0.56 X10(3) UL (ref ?–0.01)
NEUTROPHIL ABS PRELIM: 10.24 X10 (3) UL (ref 1.3–6.7)
NEUTROPHIL ABS PRELIM: 10.58 X10 (3) UL (ref 1.3–6.7)
NEUTROPHIL ABS PRELIM: 11.96 X10 (3) UL (ref 1.3–6.7)
NEUTROPHIL ABS PRELIM: 12.2 X10 (3) UL (ref 1.3–6.7)
NEUTROPHIL ABS PRELIM: 13.71 X10 (3) UL (ref 1.3–6.7)
NEUTROPHIL ABS PRELIM: 13.73 X10 (3) UL (ref 1.3–6.7)
NEUTROPHIL ABS PRELIM: 13.91 X10 (3) UL (ref 1.3–6.7)
NEUTROPHIL ABS PRELIM: 14.99 X10 (3) UL (ref 1.3–6.7)
NEUTROPHIL ABS PRELIM: 15.65 X10 (3) UL (ref 1.3–6.7)
NEUTROPHIL ABS PRELIM: 19.47 X10 (3) UL (ref 1.3–6.7)
NEUTROPHIL ABS PRELIM: 4.63 X10 (3) UL (ref 1.3–6.7)
NEUTROPHIL ABS PRELIM: 6 X10 (3) UL (ref 1.3–6.7)
NEUTROPHIL ABS PRELIM: 6.07 X10 (3) UL (ref 1.3–6.7)
NEUTROPHIL ABS PRELIM: 6.27 X10 (3) UL (ref 1.3–6.7)
NEUTROPHIL ABS PRELIM: 9.61 X10 (3) UL (ref 1.3–6.7)
NEUTROPHIL ABSOLUTE MANUAL: 10.08 X10(3) UL (ref 1.3–6.7)
NEUTROPHIL ABSOLUTE MANUAL: 11.29 X10(3) UL (ref 1.3–6.7)
NEUTROPHIL ABSOLUTE MANUAL: 12.21 X10(3) UL (ref 1.3–6.7)
NEUTROPHIL ABSOLUTE MANUAL: 15.91 X10(3) UL (ref 1.3–6.7)
NEUTROPHIL ABSOLUTE MANUAL: 16.93 X10(3) UL (ref 1.3–6.7)
NEUTROPHILS # BLD AUTO: 11.96 X10(3) UL (ref 1.3–6.7)
NEUTROPHILS # BLD AUTO: 13.71 X10(3) UL (ref 1.3–6.7)
NEUTROPHILS # BLD AUTO: 13.73 X10(3) UL (ref 1.3–6.7)
NEUTROPHILS # BLD AUTO: 13.91 X10(3) UL (ref 1.3–6.7)
NEUTROPHILS # BLD AUTO: 19.47 X10(3) UL (ref 1.3–6.7)
NEUTROPHILS # BLD AUTO: 4.63 X10(3) UL (ref 1.3–6.7)
NEUTROPHILS # BLD AUTO: 6 X10(3) UL (ref 1.3–6.7)
NEUTROPHILS # BLD AUTO: 6.07 X10(3) UL (ref 1.3–6.7)
NEUTROPHILS # BLD AUTO: 6.27 X10(3) UL (ref 1.3–6.7)
NEUTROPHILS # BLD AUTO: 9.61 X10(3) UL (ref 1.3–6.7)
NEUTROPHILS % MANUAL: 57 %
NEUTROPHILS % MANUAL: 59 %
NEUTROPHILS % MANUAL: 69 %
NEUTROPHILS % MANUAL: 72 %
NEUTROPHILS % MANUAL: 82 %
NEUTROPHILS NFR BLD AUTO: 61.7 %
NEUTROPHILS NFR BLD AUTO: 63.8 %
NEUTROPHILS NFR BLD AUTO: 64.2 %
NEUTROPHILS NFR BLD AUTO: 64.8 %
NEUTROPHILS NFR BLD AUTO: 65.3 %
NEUTROPHILS NFR BLD AUTO: 73.6 %
NEUTROPHILS NFR BLD AUTO: 78.1 %
NEUTROPHILS NFR BLD AUTO: 81.3 %
NEUTROPHILS NFR BLD AUTO: 81.3 %
NEUTROPHILS NFR BLD AUTO: 81.7 %
NRBC CALCULATED: 2
P AXIS: 50 DEGREES
P AXIS: 69 DEGREES
P-R INTERVAL: 164 MS
P-R INTERVAL: 170 MS
P/F RATIO: 373.8 MMHG
P/F RATIO: 465.6 MMHG
PARAINFLUENZA 1 PCR:: NEGATIVE
PARAINFLUENZA 1 PCR:: NEGATIVE
PARAINFLUENZA 2 PCR:: NEGATIVE
PARAINFLUENZA 2 PCR:: NEGATIVE
PARAINFLUENZA 3 PCR:: NEGATIVE
PARAINFLUENZA 3 PCR:: NEGATIVE
PARAINFLUENZA 4 PCR:: NEGATIVE
PARAINFLUENZA 4 PCR:: NEGATIVE
PATIENT TEMPERATURE: 97 F
PATIENT TEMPERATURE: 97.8 F
PATIENT TEMPERATURE: 98.2 F
PEEP: 5 CM H2O
PHOSPHATE SERPL-MCNC: 1.7 MG/DL (ref 2.5–4.9)
PHOSPHATE SERPL-MCNC: 2.5 MG/DL (ref 2.5–4.9)
PHOSPHATE SERPL-MCNC: 3.3 MG/DL (ref 2.5–4.9)
PHOSPHATE SERPL-MCNC: 3.4 MG/DL (ref 2.5–4.9)
PHOSPHATE SERPL-MCNC: 3.4 MG/DL (ref 2.5–4.9)
PHOSPHATE SERPL-MCNC: 3.7 MG/DL (ref 2.5–4.9)
PHOSPHATE SERPL-MCNC: 3.8 MG/DL (ref 2.5–4.9)
PLATELET # BLD AUTO: 110 10(3)UL (ref 150–450)
PLATELET # BLD AUTO: 121 10(3)UL (ref 150–450)
PLATELET # BLD AUTO: 152 10(3)UL (ref 150–450)
PLATELET # BLD AUTO: 173 10(3)UL (ref 150–450)
PLATELET # BLD AUTO: 190 10(3)UL (ref 150–450)
PLATELET # BLD AUTO: 193 10(3)UL (ref 150–450)
PLATELET # BLD AUTO: 218 10(3)UL (ref 150–450)
PLATELET # BLD AUTO: 223 10(3)UL (ref 150–450)
PLATELET # BLD AUTO: 226 10(3)UL (ref 150–450)
PLATELET # BLD AUTO: 226 10(3)UL (ref 150–450)
PLATELET # BLD AUTO: 229 10(3)UL (ref 150–450)
PLATELET # BLD AUTO: 235 10(3)UL (ref 150–450)
PLATELET # BLD AUTO: 237 10(3)UL (ref 150–450)
PLATELET # BLD AUTO: 267 10(3)UL (ref 150–450)
PLATELET # BLD AUTO: 310 10(3)UL (ref 150–450)
PLATELET # BLD AUTO: 337 10(3)UL (ref 150–450)
PLATELET MORPHOLOGY: NORMAL
POTASSIUM BLOOD GAS: 5.2 MMOL/L (ref 3.6–5.1)
POTASSIUM BLOOD GAS: 5.8 MMOL/L (ref 3.6–5.1)
POTASSIUM SERPL-SCNC: 3.3 MMOL/L (ref 3.6–5.1)
POTASSIUM SERPL-SCNC: 3.3 MMOL/L (ref 3.6–5.1)
POTASSIUM SERPL-SCNC: 3.4 MMOL/L (ref 3.6–5.1)
POTASSIUM SERPL-SCNC: 3.7 MMOL/L (ref 3.6–5.1)
POTASSIUM SERPL-SCNC: 3.8 MMOL/L (ref 3.6–5.1)
POTASSIUM SERPL-SCNC: 3.9 MMOL/L (ref 3.6–5.1)
POTASSIUM SERPL-SCNC: 3.9 MMOL/L (ref 3.6–5.1)
POTASSIUM SERPL-SCNC: 4 MMOL/L (ref 3.6–5.1)
POTASSIUM SERPL-SCNC: 4 MMOL/L (ref 3.6–5.1)
POTASSIUM SERPL-SCNC: 4.1 MMOL/L (ref 3.6–5.1)
POTASSIUM SERPL-SCNC: 4.5 MMOL/L (ref 3.6–5.1)
POTASSIUM SERPL-SCNC: 4.8 MMOL/L (ref 3.6–5.1)
POTASSIUM SERPL-SCNC: 4.9 MMOL/L (ref 3.6–5.1)
POTASSIUM SERPL-SCNC: 5 MMOL/L (ref 3.6–5.1)
POTASSIUM SERPL-SCNC: 5.2 MMOL/L (ref 3.6–5.1)
POTASSIUM SERPL-SCNC: 5.2 MMOL/L (ref 3.6–5.1)
POTASSIUM SERPL-SCNC: 5.4 MMOL/L (ref 3.6–5.1)
POTASSIUM SERPL-SCNC: 6.1 MMOL/L (ref 3.6–5.1)
PROCALCITONIN SERPL-MCNC: 8.43 NG/ML (ref ?–0.11)
Q-T INTERVAL: 324 MS
Q-T INTERVAL: 378 MS
QRS DURATION: 76 MS
QRS DURATION: 86 MS
QTC CALCULATION (BEZET): 394 MS
QTC CALCULATION (BEZET): 449 MS
R AXIS: -16 DEGREES
R AXIS: -7 DEGREES
RBC # BLD AUTO: 2.48 X10(6)UL (ref 3.8–5.1)
RBC # BLD AUTO: 2.57 X10(6)UL (ref 3.8–5.1)
RBC # BLD AUTO: 2.66 X10(6)UL (ref 3.8–5.1)
RBC # BLD AUTO: 2.7 X10(6)UL (ref 3.8–5.1)
RBC # BLD AUTO: 2.71 X10(6)UL (ref 3.8–5.1)
RBC # BLD AUTO: 2.72 X10(6)UL (ref 3.8–5.1)
RBC # BLD AUTO: 2.98 X10(6)UL (ref 3.8–5.1)
RBC # BLD AUTO: 3.01 X10(6)UL (ref 3.8–5.1)
RBC # BLD AUTO: 3.1 X10(6)UL (ref 3.8–5.1)
RBC # BLD AUTO: 3.16 X10(6)UL (ref 3.8–5.1)
RBC # BLD AUTO: 3.45 X10(6)UL (ref 3.8–5.1)
RBC # BLD AUTO: 3.52 X10(6)UL (ref 3.8–5.1)
RBC # BLD AUTO: 3.53 X10(6)UL (ref 3.8–5.1)
RBC # BLD AUTO: 4.01 X10(6)UL (ref 3.8–5.1)
RBC # BLD AUTO: 4.03 X10(6)UL (ref 3.8–5.1)
RED CELL DISTRIBUTION WIDTH-SD: 52.6 FL (ref 35.1–46.3)
RED CELL DISTRIBUTION WIDTH-SD: 54.4 FL (ref 35.1–46.3)
RED CELL DISTRIBUTION WIDTH-SD: 54.7 FL (ref 35.1–46.3)
RED CELL DISTRIBUTION WIDTH-SD: 55.8 FL (ref 35.1–46.3)
RED CELL DISTRIBUTION WIDTH-SD: 55.9 FL (ref 35.1–46.3)
RED CELL DISTRIBUTION WIDTH-SD: 56.3 FL (ref 35.1–46.3)
RED CELL DISTRIBUTION WIDTH-SD: 56.4 FL (ref 35.1–46.3)
RED CELL DISTRIBUTION WIDTH-SD: 58.2 FL (ref 35.1–46.3)
RED CELL DISTRIBUTION WIDTH-SD: 61.6 FL (ref 35.1–46.3)
RED CELL DISTRIBUTION WIDTH-SD: 65.4 FL (ref 35.1–46.3)
RED CELL DISTRIBUTION WIDTH-SD: 66.1 FL (ref 35.1–46.3)
RED CELL DISTRIBUTION WIDTH-SD: 66.5 FL (ref 35.1–46.3)
RED CELL DISTRIBUTION WIDTH-SD: 66.7 FL (ref 35.1–46.3)
RED CELL DISTRIBUTION WIDTH-SD: 66.8 FL (ref 35.1–46.3)
RED CELL DISTRIBUTION WIDTH-SD: 67.7 FL (ref 35.1–46.3)
RHINOVIRUS/ENTERO PCR:: NEGATIVE
RHINOVIRUS/ENTERO PCR:: NEGATIVE
RSV RNA SPEC QL NAA+PROBE: NEGATIVE
RSV RNA SPEC QL NAA+PROBE: NEGATIVE
SODIUM BLOOD GAS: 133 MMOL/L (ref 136–144)
SODIUM BLOOD GAS: 136 MMOL/L (ref 136–144)
SODIUM SERPL-SCNC: 131 MMOL/L (ref 136–144)
SODIUM SERPL-SCNC: 133 MMOL/L (ref 136–144)
SODIUM SERPL-SCNC: 134 MMOL/L (ref 136–144)
SODIUM SERPL-SCNC: 134 MMOL/L (ref 136–144)
SODIUM SERPL-SCNC: 135 MMOL/L (ref 136–144)
SODIUM SERPL-SCNC: 135 MMOL/L (ref 136–144)
SODIUM SERPL-SCNC: 136 MMOL/L (ref 136–144)
SODIUM SERPL-SCNC: 137 MMOL/L (ref 136–144)
SODIUM SERPL-SCNC: 138 MMOL/L (ref 136–144)
SODIUM SERPL-SCNC: 138 MMOL/L (ref 136–144)
SODIUM SERPL-SCNC: 139 MMOL/L (ref 136–144)
SODIUM SERPL-SCNC: 140 MMOL/L (ref 136–144)
SODIUM SERPL-SCNC: 141 MMOL/L (ref 136–144)
T AXIS: 141 DEGREES
T AXIS: 212 DEGREES
TIDAL VOLUME: 450 ML
TIDAL VOLUME: 450 ML
TOTAL CELLS COUNTED: 100
TOTAL HEMOGLOBIN: 8.1 G/DL (ref 11.7–16)
TOTAL HEMOGLOBIN: 8.5 G/DL (ref 11.7–16)
TOTAL HEMOGLOBIN: 8.5 G/DL (ref 11.7–16)
VENT RATE: 18 /MIN
VENT RATE: 18 /MIN
VENTRICULAR RATE: 85 BPM
VENTRICULAR RATE: 89 BPM
WBC # BLD AUTO: 10.2 X10(3) UL (ref 4–13)
WBC # BLD AUTO: 14.7 X10(3) UL (ref 4–13)
WBC # BLD AUTO: 15.1 X10(3) UL (ref 4–13)
WBC # BLD AUTO: 15.9 X10(3) UL (ref 4–13)
WBC # BLD AUTO: 16 X10(3) UL (ref 4–13)
WBC # BLD AUTO: 16.9 X10(3) UL (ref 4–13)
WBC # BLD AUTO: 17.6 X10(3) UL (ref 4–13)
WBC # BLD AUTO: 18.5 X10(3) UL (ref 4–13)
WBC # BLD AUTO: 18.9 X10(3) UL (ref 4–13)
WBC # BLD AUTO: 20.4 X10(3) UL (ref 4–13)
WBC # BLD AUTO: 21.5 X10(3) UL (ref 4–13)
WBC # BLD AUTO: 23.8 X10(3) UL (ref 4–13)
WBC # BLD AUTO: 7.2 X10(3) UL (ref 4–13)
WBC # BLD AUTO: 9.2 X10(3) UL (ref 4–13)
WBC # BLD AUTO: 9.5 X10(3) UL (ref 4–13)

## 2018-01-01 PROCEDURE — 36415 COLL VENOUS BLD VENIPUNCTURE: CPT

## 2018-01-01 PROCEDURE — 83036 HEMOGLOBIN GLYCOSYLATED A1C: CPT | Performed by: HOSPITALIST

## 2018-01-01 PROCEDURE — 82150 ASSAY OF AMYLASE: CPT

## 2018-01-01 PROCEDURE — 47000 NEEDLE BIOPSY OF LIVER PERQ: CPT

## 2018-01-01 PROCEDURE — 85610 PROTHROMBIN TIME: CPT | Performed by: INTERNAL MEDICINE

## 2018-01-01 PROCEDURE — 94640 AIRWAY INHALATION TREATMENT: CPT

## 2018-01-01 PROCEDURE — 82962 GLUCOSE BLOOD TEST: CPT

## 2018-01-01 PROCEDURE — 83605 ASSAY OF LACTIC ACID: CPT | Performed by: EMERGENCY MEDICINE

## 2018-01-01 PROCEDURE — 86920 COMPATIBILITY TEST SPIN: CPT

## 2018-01-01 PROCEDURE — 85018 HEMOGLOBIN: CPT | Performed by: EMERGENCY MEDICINE

## 2018-01-01 PROCEDURE — 99291 CRITICAL CARE FIRST HOUR: CPT

## 2018-01-01 PROCEDURE — 94002 VENT MGMT INPAT INIT DAY: CPT

## 2018-01-01 PROCEDURE — 94660 CPAP INITIATION&MGMT: CPT

## 2018-01-01 PROCEDURE — 80053 COMPREHEN METABOLIC PANEL: CPT

## 2018-01-01 PROCEDURE — 83050 HGB METHEMOGLOBIN QUAN: CPT | Performed by: EMERGENCY MEDICINE

## 2018-01-01 PROCEDURE — 93005 ELECTROCARDIOGRAM TRACING: CPT

## 2018-01-01 PROCEDURE — 83605 ASSAY OF LACTIC ACID: CPT | Performed by: INTERNAL MEDICINE

## 2018-01-01 PROCEDURE — 83735 ASSAY OF MAGNESIUM: CPT | Performed by: INTERNAL MEDICINE

## 2018-01-01 PROCEDURE — 82375 ASSAY CARBOXYHB QUANT: CPT | Performed by: INTERNAL MEDICINE

## 2018-01-01 PROCEDURE — 81001 URINALYSIS AUTO W/SCOPE: CPT

## 2018-01-01 PROCEDURE — 83735 ASSAY OF MAGNESIUM: CPT

## 2018-01-01 PROCEDURE — 90935 HEMODIALYSIS ONE EVALUATION: CPT | Performed by: INTERNAL MEDICINE

## 2018-01-01 PROCEDURE — 87486 CHLMYD PNEUM DNA AMP PROBE: CPT

## 2018-01-01 PROCEDURE — 82375 ASSAY CARBOXYHB QUANT: CPT | Performed by: EMERGENCY MEDICINE

## 2018-01-01 PROCEDURE — 80069 RENAL FUNCTION PANEL: CPT | Performed by: INTERNAL MEDICINE

## 2018-01-01 PROCEDURE — 80048 BASIC METABOLIC PNL TOTAL CA: CPT | Performed by: INTERNAL MEDICINE

## 2018-01-01 PROCEDURE — 85027 COMPLETE CBC AUTOMATED: CPT

## 2018-01-01 PROCEDURE — 31500 INSERT EMERGENCY AIRWAY: CPT

## 2018-01-01 PROCEDURE — 85025 COMPLETE CBC W/AUTO DIFF WBC: CPT

## 2018-01-01 PROCEDURE — 74018 RADEX ABDOMEN 1 VIEW: CPT | Performed by: ANESTHESIOLOGY

## 2018-01-01 PROCEDURE — 36600 WITHDRAWAL OF ARTERIAL BLOOD: CPT | Performed by: INTERNAL MEDICINE

## 2018-01-01 PROCEDURE — 97162 PT EVAL MOD COMPLEX 30 MIN: CPT

## 2018-01-01 PROCEDURE — 83690 ASSAY OF LIPASE: CPT

## 2018-01-01 PROCEDURE — 97116 GAIT TRAINING THERAPY: CPT

## 2018-01-01 PROCEDURE — 84100 ASSAY OF PHOSPHORUS: CPT | Performed by: INTERNAL MEDICINE

## 2018-01-01 PROCEDURE — 83050 HGB METHEMOGLOBIN QUAN: CPT | Performed by: INTERNAL MEDICINE

## 2018-01-01 PROCEDURE — 82803 BLOOD GASES ANY COMBINATION: CPT | Performed by: INTERNAL MEDICINE

## 2018-01-01 PROCEDURE — 87999 UNLISTED MICROBIOLOGY PX: CPT

## 2018-01-01 PROCEDURE — 85018 HEMOGLOBIN: CPT | Performed by: INTERNAL MEDICINE

## 2018-01-01 PROCEDURE — 36600 WITHDRAWAL OF ARTERIAL BLOOD: CPT | Performed by: NURSE PRACTITIONER

## 2018-01-01 PROCEDURE — 82330 ASSAY OF CALCIUM: CPT | Performed by: INTERNAL MEDICINE

## 2018-01-01 PROCEDURE — 82803 BLOOD GASES ANY COMBINATION: CPT | Performed by: EMERGENCY MEDICINE

## 2018-01-01 PROCEDURE — 94664 DEMO&/EVAL PT USE INHALER: CPT

## 2018-01-01 PROCEDURE — 86850 RBC ANTIBODY SCREEN: CPT

## 2018-01-01 PROCEDURE — 82803 BLOOD GASES ANY COMBINATION: CPT

## 2018-01-01 PROCEDURE — 82375 ASSAY CARBOXYHB QUANT: CPT | Performed by: NURSE PRACTITIONER

## 2018-01-01 PROCEDURE — 85018 HEMOGLOBIN: CPT | Performed by: NURSE PRACTITIONER

## 2018-01-01 PROCEDURE — 85007 BL SMEAR W/DIFF WBC COUNT: CPT

## 2018-01-01 PROCEDURE — 85610 PROTHROMBIN TIME: CPT

## 2018-01-01 PROCEDURE — 85025 COMPLETE CBC W/AUTO DIFF WBC: CPT | Performed by: EMERGENCY MEDICINE

## 2018-01-01 PROCEDURE — 93010 ELECTROCARDIOGRAM REPORT: CPT | Performed by: INTERNAL MEDICINE

## 2018-01-01 PROCEDURE — 87081 CULTURE SCREEN ONLY: CPT | Performed by: INTERNAL MEDICINE

## 2018-01-01 PROCEDURE — 94003 VENT MGMT INPAT SUBQ DAY: CPT

## 2018-01-01 PROCEDURE — 71045 X-RAY EXAM CHEST 1 VIEW: CPT | Performed by: EMERGENCY MEDICINE

## 2018-01-01 PROCEDURE — 93010 ELECTROCARDIOGRAM REPORT: CPT

## 2018-01-01 PROCEDURE — 83605 ASSAY OF LACTIC ACID: CPT

## 2018-01-01 PROCEDURE — 70450 CT HEAD/BRAIN W/O DYE: CPT | Performed by: EMERGENCY MEDICINE

## 2018-01-01 PROCEDURE — 82248 BILIRUBIN DIRECT: CPT

## 2018-01-01 PROCEDURE — 85378 FIBRIN DEGRADE SEMIQUANT: CPT

## 2018-01-01 PROCEDURE — 84145 PROCALCITONIN (PCT): CPT | Performed by: INTERNAL MEDICINE

## 2018-01-01 PROCEDURE — 85018 HEMOGLOBIN: CPT

## 2018-01-01 PROCEDURE — 97166 OT EVAL MOD COMPLEX 45 MIN: CPT

## 2018-01-01 PROCEDURE — 87486 CHLMYD PNEUM DNA AMP PROBE: CPT | Performed by: INTERNAL MEDICINE

## 2018-01-01 PROCEDURE — 82803 BLOOD GASES ANY COMBINATION: CPT | Performed by: NURSE PRACTITIONER

## 2018-01-01 PROCEDURE — 97530 THERAPEUTIC ACTIVITIES: CPT

## 2018-01-01 PROCEDURE — 82330 ASSAY OF CALCIUM: CPT | Performed by: EMERGENCY MEDICINE

## 2018-01-01 PROCEDURE — 82375 ASSAY CARBOXYHB QUANT: CPT

## 2018-01-01 PROCEDURE — 96361 HYDRATE IV INFUSION ADD-ON: CPT

## 2018-01-01 PROCEDURE — 71045 X-RAY EXAM CHEST 1 VIEW: CPT | Performed by: ANESTHESIOLOGY

## 2018-01-01 PROCEDURE — 84100 ASSAY OF PHOSPHORUS: CPT

## 2018-01-01 PROCEDURE — 71045 X-RAY EXAM CHEST 1 VIEW: CPT | Performed by: INTERNAL MEDICINE

## 2018-01-01 PROCEDURE — 0BH18EZ INSERTION OF ENDOTRACHEAL AIRWAY INTO TRACHEA, VIA NATURAL OR ARTIFICIAL OPENING ENDOSCOPIC: ICD-10-PCS | Performed by: EMERGENCY MEDICINE

## 2018-01-01 PROCEDURE — 76942 ECHO GUIDE FOR BIOPSY: CPT

## 2018-01-01 PROCEDURE — 80053 COMPREHEN METABOLIC PANEL: CPT | Performed by: EMERGENCY MEDICINE

## 2018-01-01 PROCEDURE — 5A12012 PERFORMANCE OF CARDIAC OUTPUT, SINGLE, MANUAL: ICD-10-PCS | Performed by: EMERGENCY MEDICINE

## 2018-01-01 PROCEDURE — 87081 CULTURE SCREEN ONLY: CPT | Performed by: HOSPITALIST

## 2018-01-01 PROCEDURE — 87798 DETECT AGENT NOS DNA AMP: CPT

## 2018-01-01 PROCEDURE — 84132 ASSAY OF SERUM POTASSIUM: CPT | Performed by: EMERGENCY MEDICINE

## 2018-01-01 PROCEDURE — 85384 FIBRINOGEN ACTIVITY: CPT

## 2018-01-01 PROCEDURE — 99292 CRITICAL CARE ADDL 30 MIN: CPT

## 2018-01-01 PROCEDURE — 87581 M.PNEUMON DNA AMP PROBE: CPT | Performed by: INTERNAL MEDICINE

## 2018-01-01 PROCEDURE — 84132 ASSAY OF SERUM POTASSIUM: CPT | Performed by: INTERNAL MEDICINE

## 2018-01-01 PROCEDURE — 83050 HGB METHEMOGLOBIN QUAN: CPT | Performed by: NURSE PRACTITIONER

## 2018-01-01 PROCEDURE — 85027 COMPLETE CBC AUTOMATED: CPT | Performed by: EMERGENCY MEDICINE

## 2018-01-01 PROCEDURE — 84295 ASSAY OF SERUM SODIUM: CPT | Performed by: INTERNAL MEDICINE

## 2018-01-01 PROCEDURE — 36600 WITHDRAWAL OF ARTERIAL BLOOD: CPT | Performed by: EMERGENCY MEDICINE

## 2018-01-01 PROCEDURE — 71045 X-RAY EXAM CHEST 1 VIEW: CPT

## 2018-01-01 PROCEDURE — 83050 HGB METHEMOGLOBIN QUAN: CPT

## 2018-01-01 PROCEDURE — 88331 PATH CONSLTJ SURG 1 BLK 1SPC: CPT | Performed by: HOSPITALIST

## 2018-01-01 PROCEDURE — 84295 ASSAY OF SERUM SODIUM: CPT | Performed by: EMERGENCY MEDICINE

## 2018-01-01 PROCEDURE — 85049 AUTOMATED PLATELET COUNT: CPT | Performed by: HOSPITALIST

## 2018-01-01 PROCEDURE — 90945 DIALYSIS ONE EVALUATION: CPT | Performed by: INTERNAL MEDICINE

## 2018-01-01 PROCEDURE — 96368 THER/DIAG CONCURRENT INF: CPT

## 2018-01-01 PROCEDURE — 85007 BL SMEAR W/DIFF WBC COUNT: CPT | Performed by: EMERGENCY MEDICINE

## 2018-01-01 PROCEDURE — 82977 ASSAY OF GGT: CPT

## 2018-01-01 PROCEDURE — 87798 DETECT AGENT NOS DNA AMP: CPT | Performed by: INTERNAL MEDICINE

## 2018-01-01 PROCEDURE — 36430 TRANSFUSION BLD/BLD COMPNT: CPT

## 2018-01-01 PROCEDURE — 71275 CT ANGIOGRAPHY CHEST: CPT | Performed by: EMERGENCY MEDICINE

## 2018-01-01 PROCEDURE — 87633 RESP VIRUS 12-25 TARGETS: CPT

## 2018-01-01 PROCEDURE — 96375 TX/PRO/DX INJ NEW DRUG ADDON: CPT

## 2018-01-01 PROCEDURE — 87040 BLOOD CULTURE FOR BACTERIA: CPT | Performed by: EMERGENCY MEDICINE

## 2018-01-01 PROCEDURE — 87633 RESP VIRUS 12-25 TARGETS: CPT | Performed by: INTERNAL MEDICINE

## 2018-01-01 PROCEDURE — 86901 BLOOD TYPING SEROLOGIC RH(D): CPT

## 2018-01-01 PROCEDURE — 85730 THROMBOPLASTIN TIME PARTIAL: CPT

## 2018-01-01 PROCEDURE — 85025 COMPLETE CBC W/AUTO DIFF WBC: CPT | Performed by: INTERNAL MEDICINE

## 2018-01-01 PROCEDURE — 85730 THROMBOPLASTIN TIME PARTIAL: CPT | Performed by: EMERGENCY MEDICINE

## 2018-01-01 PROCEDURE — 81001 URINALYSIS AUTO W/SCOPE: CPT | Performed by: INTERNAL MEDICINE

## 2018-01-01 PROCEDURE — 5A1945Z RESPIRATORY VENTILATION, 24-96 CONSECUTIVE HOURS: ICD-10-PCS | Performed by: EMERGENCY MEDICINE

## 2018-01-01 PROCEDURE — 83615 LACTATE (LD) (LDH) ENZYME: CPT

## 2018-01-01 PROCEDURE — 84484 ASSAY OF TROPONIN QUANT: CPT | Performed by: EMERGENCY MEDICINE

## 2018-01-01 PROCEDURE — 85610 PROTHROMBIN TIME: CPT | Performed by: EMERGENCY MEDICINE

## 2018-01-01 PROCEDURE — 74177 CT ABD & PELVIS W/CONTRAST: CPT | Performed by: EMERGENCY MEDICINE

## 2018-01-01 PROCEDURE — 5A1D70Z PERFORMANCE OF URINARY FILTRATION, INTERMITTENT, LESS THAN 6 HOURS PER DAY: ICD-10-PCS | Performed by: INTERNAL MEDICINE

## 2018-01-01 PROCEDURE — 96366 THER/PROPH/DIAG IV INF ADDON: CPT

## 2018-01-01 PROCEDURE — 96365 THER/PROPH/DIAG IV INF INIT: CPT

## 2018-01-01 PROCEDURE — 97535 SELF CARE MNGMENT TRAINING: CPT

## 2018-01-01 PROCEDURE — 0D9670Z DRAINAGE OF STOMACH WITH DRAINAGE DEVICE, VIA NATURAL OR ARTIFICIAL OPENING: ICD-10-PCS | Performed by: EMERGENCY MEDICINE

## 2018-01-01 PROCEDURE — 86900 BLOOD TYPING SEROLOGIC ABO: CPT

## 2018-01-01 PROCEDURE — 87581 M.PNEUMON DNA AMP PROBE: CPT

## 2018-01-01 PROCEDURE — 96372 THER/PROPH/DIAG INJ SC/IM: CPT

## 2018-01-01 PROCEDURE — 88307 TISSUE EXAM BY PATHOLOGIST: CPT | Performed by: HOSPITALIST

## 2018-01-01 RX ORDER — PRAVASTATIN SODIUM 20 MG
20 TABLET ORAL NIGHTLY
COMMUNITY

## 2018-01-01 RX ORDER — CALCIUM CARBONATE 200(500)MG
2 TABLET,CHEWABLE ORAL DAILY PRN
COMMUNITY

## 2018-01-01 RX ORDER — SEVELAMER HYDROCHLORIDE 400 MG/1
1600 TABLET, FILM COATED ORAL
Status: DISCONTINUED | OUTPATIENT
Start: 2018-01-01 | End: 2018-01-01

## 2018-01-01 RX ORDER — TRAMADOL HYDROCHLORIDE 50 MG/1
50 TABLET ORAL ONCE
Status: COMPLETED | OUTPATIENT
Start: 2018-01-01 | End: 2018-01-01

## 2018-01-01 RX ORDER — SODIUM CHLORIDE 9 MG/ML
INJECTION, SOLUTION INTRAVENOUS
Status: DISCONTINUED | OUTPATIENT
Start: 2018-01-01 | End: 2018-01-01

## 2018-01-01 RX ORDER — HYDROCODONE BITARTRATE AND ACETAMINOPHEN 5; 325 MG/1; MG/1
1 TABLET ORAL EVERY 6 HOURS PRN
Status: DISCONTINUED | OUTPATIENT
Start: 2018-01-01 | End: 2018-01-01

## 2018-01-01 RX ORDER — MONTELUKAST SODIUM 10 MG/1
10 TABLET ORAL NIGHTLY
COMMUNITY

## 2018-01-01 RX ORDER — ALBUTEROL SULFATE 2.5 MG/3ML
2.5 SOLUTION RESPIRATORY (INHALATION) EVERY 4 HOURS PRN
Status: DISCONTINUED | OUTPATIENT
Start: 2018-01-01 | End: 2018-01-01

## 2018-01-01 RX ORDER — BENZONATATE 200 MG/1
200 CAPSULE ORAL EVERY 4 HOURS PRN
Status: DISCONTINUED | OUTPATIENT
Start: 2018-01-01 | End: 2018-01-01

## 2018-01-01 RX ORDER — MONTELUKAST SODIUM 10 MG/1
10 TABLET ORAL NIGHTLY
Status: DISCONTINUED | OUTPATIENT
Start: 2018-01-01 | End: 2018-01-01

## 2018-01-01 RX ORDER — ASPIRIN 81 MG
1 TABLET,CHEWABLE ORAL EVERY 8 HOURS PRN
COMMUNITY

## 2018-01-01 RX ORDER — SODIUM CHLORIDE 9 MG/ML
INJECTION, SOLUTION INTRAVENOUS CONTINUOUS
Status: DISCONTINUED | OUTPATIENT
Start: 2018-01-01 | End: 2018-01-01

## 2018-01-01 RX ORDER — TRAMADOL HYDROCHLORIDE 50 MG/1
50 TABLET ORAL 2 TIMES DAILY
COMMUNITY

## 2018-01-01 RX ORDER — MUSCLE RUB CREAM 100; 150 MG/G; MG/G
1 CREAM TOPICAL EVERY 8 HOURS PRN
Status: DISCONTINUED | OUTPATIENT
Start: 2018-01-01 | End: 2018-01-01

## 2018-01-01 RX ORDER — MIDODRINE HYDROCHLORIDE 10 MG/1
10 TABLET ORAL SEE ADMIN INSTRUCTIONS
COMMUNITY

## 2018-01-01 RX ORDER — HEPARIN SODIUM 1000 [USP'U]/ML
1.5 INJECTION, SOLUTION INTRAVENOUS; SUBCUTANEOUS ONCE
Status: COMPLETED | OUTPATIENT
Start: 2018-01-01 | End: 2018-01-01

## 2018-01-01 RX ORDER — CHLORHEXIDINE GLUCONATE 0.12 MG/ML
15 RINSE ORAL
Status: DISCONTINUED | OUTPATIENT
Start: 2018-01-01 | End: 2018-01-01

## 2018-01-01 RX ORDER — BISACODYL 10 MG
10 SUPPOSITORY, RECTAL RECTAL DAILY PRN
Status: DISCONTINUED | OUTPATIENT
Start: 2018-01-01 | End: 2018-01-01

## 2018-01-01 RX ORDER — PRAVASTATIN SODIUM 20 MG
20 TABLET ORAL NIGHTLY
Status: DISCONTINUED | OUTPATIENT
Start: 2018-01-01 | End: 2018-01-01

## 2018-01-01 RX ORDER — PREDNISONE 1 MG/1
5 TABLET ORAL
Status: DISCONTINUED | OUTPATIENT
Start: 2018-01-01 | End: 2018-01-01

## 2018-01-01 RX ORDER — INSULIN ASPART 100 [IU]/ML
0.1 INJECTION, SOLUTION INTRAVENOUS; SUBCUTANEOUS ONCE
Status: COMPLETED | OUTPATIENT
Start: 2018-01-01 | End: 2018-01-01

## 2018-01-01 RX ORDER — MIDODRINE HYDROCHLORIDE 5 MG/1
10 TABLET ORAL ONCE
Status: COMPLETED | OUTPATIENT
Start: 2018-01-01 | End: 2018-01-01

## 2018-01-01 RX ORDER — ALBUTEROL SULFATE 2.5 MG/3ML
2.5 SOLUTION RESPIRATORY (INHALATION)
Status: DISCONTINUED | OUTPATIENT
Start: 2018-01-01 | End: 2018-01-01

## 2018-01-01 RX ORDER — ATORVASTATIN CALCIUM 40 MG/1
40 TABLET, FILM COATED ORAL ONCE
Status: COMPLETED | OUTPATIENT
Start: 2018-01-01 | End: 2018-01-01

## 2018-01-01 RX ORDER — PREDNISONE 1 MG/1
5 TABLET ORAL DAILY
COMMUNITY

## 2018-01-01 RX ORDER — LEVETIRACETAM 250 MG/1
250 TABLET ORAL 2 TIMES DAILY
Status: DISCONTINUED | OUTPATIENT
Start: 2018-01-01 | End: 2018-01-01

## 2018-01-01 RX ORDER — VECURONIUM BROMIDE 1 MG/ML
10 INJECTION, POWDER, LYOPHILIZED, FOR SOLUTION INTRAVENOUS ONCE AS NEEDED
Status: COMPLETED | OUTPATIENT
Start: 2018-01-01 | End: 2018-01-01

## 2018-01-01 RX ORDER — ESCITALOPRAM OXALATE 10 MG/1
10 TABLET ORAL EVERY MORNING
Status: DISCONTINUED | OUTPATIENT
Start: 2018-01-01 | End: 2018-01-01

## 2018-01-01 RX ORDER — HEPARIN SODIUM 1000 [USP'U]/ML
1.5 INJECTION, SOLUTION INTRAVENOUS; SUBCUTANEOUS AS NEEDED
Status: DISCONTINUED | OUTPATIENT
Start: 2018-01-01 | End: 2018-01-01

## 2018-01-01 RX ORDER — SODIUM POLYSTYRENE SULFONATE 15 G/60ML
30 SUSPENSION ORAL; RECTAL ONCE
Status: COMPLETED | OUTPATIENT
Start: 2018-01-01 | End: 2018-01-01

## 2018-01-01 RX ORDER — CYCLOBENZAPRINE HCL 5 MG
5 TABLET ORAL 3 TIMES DAILY PRN
COMMUNITY

## 2018-01-01 RX ORDER — SODIUM CHLORIDE 9 MG/ML
INJECTION, SOLUTION INTRAVENOUS ONCE
Status: COMPLETED | OUTPATIENT
Start: 2018-01-01 | End: 2018-01-01

## 2018-01-01 RX ORDER — MIDODRINE HYDROCHLORIDE 10 MG/1
10 TABLET ORAL
COMMUNITY

## 2018-01-01 RX ORDER — SENNOSIDES 8.6 MG
8.6 TABLET ORAL 2 TIMES DAILY
Status: DISCONTINUED | OUTPATIENT
Start: 2018-01-01 | End: 2018-01-01

## 2018-01-01 RX ORDER — ALBUTEROL SULFATE 90 UG/1
2 AEROSOL, METERED RESPIRATORY (INHALATION) EVERY 4 HOURS PRN
Status: DISCONTINUED | OUTPATIENT
Start: 2018-01-01 | End: 2018-01-01

## 2018-01-01 RX ORDER — OMEPRAZOLE 20 MG/1
40 CAPSULE, DELAYED RELEASE ORAL
COMMUNITY

## 2018-01-01 RX ORDER — ONDANSETRON 4 MG/1
4 TABLET, ORALLY DISINTEGRATING ORAL EVERY 8 HOURS PRN
Status: DISCONTINUED | OUTPATIENT
Start: 2018-01-01 | End: 2018-01-01

## 2018-01-01 RX ORDER — AMOXICILLIN AND CLAVULANATE POTASSIUM 500; 125 MG/1; MG/1
1 TABLET, FILM COATED ORAL DAILY
Qty: 6 TABLET | Refills: 0 | Status: SHIPPED | OUTPATIENT
Start: 2018-01-01

## 2018-01-01 RX ORDER — DEXTROSE MONOHYDRATE 25 G/50ML
50 INJECTION, SOLUTION INTRAVENOUS
Status: DISCONTINUED | OUTPATIENT
Start: 2018-01-01 | End: 2018-01-01

## 2018-01-01 RX ORDER — INSULIN ASPART 100 [IU]/ML
30 INJECTION, SOLUTION INTRAVENOUS; SUBCUTANEOUS ONCE
Status: COMPLETED | OUTPATIENT
Start: 2018-01-01 | End: 2018-01-01

## 2018-01-01 RX ORDER — SODIUM CHLORIDE 9 MG/ML
INJECTION, SOLUTION INTRAVENOUS CONTINUOUS
Status: ACTIVE | OUTPATIENT
Start: 2018-01-01 | End: 2018-01-01

## 2018-01-01 RX ORDER — DIPHENHYDRAMINE HCL 25 MG
50 CAPSULE ORAL EVERY EVENING
COMMUNITY

## 2018-01-01 RX ORDER — BENZONATATE 100 MG/1
100 CAPSULE ORAL 3 TIMES DAILY PRN
COMMUNITY

## 2018-01-01 RX ORDER — DIPHENHYDRAMINE HCL 25 MG
50 CAPSULE ORAL NIGHTLY
Status: DISCONTINUED | OUTPATIENT
Start: 2018-01-01 | End: 2018-01-01

## 2018-01-01 RX ORDER — IPRATROPIUM BROMIDE AND ALBUTEROL SULFATE 2.5; .5 MG/3ML; MG/3ML
SOLUTION RESPIRATORY (INHALATION)
Status: COMPLETED
Start: 2018-01-01 | End: 2018-01-01

## 2018-01-01 RX ORDER — LEVOTHYROXINE SODIUM 0.05 MG/1
50 TABLET ORAL
Status: DISCONTINUED | OUTPATIENT
Start: 2018-01-01 | End: 2018-01-01

## 2018-01-01 RX ORDER — SEVELAMER CARBONATE 800 MG/1
1600 TABLET, FILM COATED ORAL
COMMUNITY

## 2018-01-01 RX ORDER — METOPROLOL TARTRATE 50 MG/1
50 TABLET, FILM COATED ORAL SEE ADMIN INSTRUCTIONS
COMMUNITY
End: 2018-01-01

## 2018-01-01 RX ORDER — FLUTICASONE PROPIONATE 50 MCG
2 SPRAY, SUSPENSION (ML) NASAL DAILY
COMMUNITY

## 2018-01-01 RX ORDER — SENNA PLUS 8.6 MG/1
1 TABLET ORAL 2 TIMES DAILY
COMMUNITY

## 2018-01-01 RX ORDER — HEPARIN SODIUM 5000 [USP'U]/ML
5000 INJECTION, SOLUTION INTRAVENOUS; SUBCUTANEOUS EVERY 8 HOURS SCHEDULED
Status: DISCONTINUED | OUTPATIENT
Start: 2018-01-01 | End: 2018-01-01

## 2018-01-01 RX ORDER — AMOXICILLIN AND CLAVULANATE POTASSIUM 500; 125 MG/1; MG/1
1 TABLET, FILM COATED ORAL DAILY
Qty: 6 TABLET | Refills: 0 | Status: SHIPPED | OUTPATIENT
Start: 2018-01-01 | End: 2018-01-01

## 2018-01-01 RX ORDER — DIPHENHYDRAMINE HCL 25 MG
25 CAPSULE ORAL EVERY 6 HOURS PRN
Status: DISCONTINUED | OUTPATIENT
Start: 2018-01-01 | End: 2018-01-01

## 2018-01-01 RX ORDER — ASCORBIC ACID, THIAMINE, RIBOFLAVIN, NIACINAMIDE, PYRIDOXINE, FOLIC ACID, COBALAMIN, BIOTIN, PANTOTHENIC ACID 100; 1.5; 1.7; 20; 10; 1; 6; 300; 1 MG/1; MG/1; MG/1; MG/1; MG/1; MG/1; UG/1; UG/1; MG/1
1 TABLET, COATED ORAL DAILY
Status: DISCONTINUED | OUTPATIENT
Start: 2018-01-01 | End: 2018-01-01

## 2018-01-01 RX ORDER — ONDANSETRON 4 MG/1
4 TABLET, ORALLY DISINTEGRATING ORAL EVERY 8 HOURS PRN
COMMUNITY

## 2018-01-01 RX ORDER — PANTOPRAZOLE SODIUM 40 MG/1
40 TABLET, DELAYED RELEASE ORAL
Status: DISCONTINUED | OUTPATIENT
Start: 2018-01-01 | End: 2018-01-01

## 2018-01-01 RX ORDER — CALCIUM CARBONATE 200(500)MG
1000 TABLET,CHEWABLE ORAL DAILY PRN
Status: DISCONTINUED | OUTPATIENT
Start: 2018-01-01 | End: 2018-01-01

## 2018-01-01 RX ORDER — FUROSEMIDE 80 MG
160 TABLET ORAL SEE ADMIN INSTRUCTIONS
COMMUNITY

## 2018-01-01 RX ORDER — ERYTHROMYCIN ETHYLSUCCINATE 200 MG/5ML
1000 SUSPENSION ORAL EVERY 4 HOURS
Status: COMPLETED | OUTPATIENT
Start: 2018-01-01 | End: 2018-01-01

## 2018-01-01 RX ORDER — METOPROLOL TARTRATE 50 MG/1
50 TABLET, FILM COATED ORAL
Status: DISCONTINUED | OUTPATIENT
Start: 2018-01-01 | End: 2018-01-01

## 2018-01-01 RX ORDER — HEPARIN SODIUM 5000 [USP'U]/ML
30000 INJECTION, SOLUTION INTRAVENOUS; SUBCUTANEOUS ONCE
Status: DISCONTINUED | OUTPATIENT
Start: 2018-01-01 | End: 2018-01-01

## 2018-01-01 RX ORDER — TRAMADOL HYDROCHLORIDE 50 MG/1
50 TABLET ORAL 2 TIMES DAILY
Status: DISCONTINUED | OUTPATIENT
Start: 2018-01-01 | End: 2018-01-01

## 2018-01-01 RX ORDER — ALBUTEROL SULFATE 2.5 MG/3ML
SOLUTION RESPIRATORY (INHALATION)
Status: COMPLETED
Start: 2018-01-01 | End: 2018-01-01

## 2018-01-01 RX ORDER — ALLOPURINOL 300 MG/1
1 TABLET ORAL EVERY 8 HOURS PRN
COMMUNITY

## 2018-01-01 RX ORDER — CYCLOBENZAPRINE HCL 5 MG
5 TABLET ORAL 3 TIMES DAILY PRN
Status: DISCONTINUED | OUTPATIENT
Start: 2018-01-01 | End: 2018-01-01

## 2018-01-01 RX ORDER — NEOMYCIN SULFATE 500 MG/1
1000 TABLET ORAL EVERY 4 HOURS
Status: COMPLETED | OUTPATIENT
Start: 2018-01-01 | End: 2018-01-01

## 2018-01-01 RX ORDER — ALPRAZOLAM 0.25 MG/1
0.25 TABLET ORAL 3 TIMES DAILY PRN
Status: DISCONTINUED | OUTPATIENT
Start: 2018-01-01 | End: 2018-01-01

## 2018-01-01 RX ORDER — ALBUTEROL SULFATE 90 UG/1
2 AEROSOL, METERED RESPIRATORY (INHALATION) EVERY 4 HOURS PRN
COMMUNITY

## 2018-01-01 RX ORDER — FLUTICASONE PROPIONATE 50 MCG
2 SPRAY, SUSPENSION (ML) NASAL DAILY
Status: DISCONTINUED | OUTPATIENT
Start: 2018-01-01 | End: 2018-01-01

## 2018-03-02 PROBLEM — N17.9 ACUTE KIDNEY INJURY (HCC): Status: ACTIVE | Noted: 2018-01-01

## 2018-03-02 PROBLEM — A41.9 SEPSIS DUE TO PNEUMONIA (HCC): Status: ACTIVE | Noted: 2018-01-01

## 2018-03-02 PROBLEM — J18.9 SEPSIS DUE TO PNEUMONIA (HCC): Status: ACTIVE | Noted: 2018-01-01

## 2018-03-02 PROBLEM — N17.9 ACUTE RENAL FAILURE (ARF) (HCC): Status: ACTIVE | Noted: 2018-01-01

## 2018-03-02 PROBLEM — N17.9 ACUTE RENAL FAILURE SUPERIMPOSED ON CHRONIC KIDNEY DISEASE, UNSPECIFIED CKD STAGE, UNSPECIFIED ACUTE RENAL FAILURE TYPE (HCC): Status: ACTIVE | Noted: 2018-01-01

## 2018-03-02 PROBLEM — B99.9 ENCEPHALOPATHY DUE TO INFECTION: Status: ACTIVE | Noted: 2018-01-01

## 2018-03-02 PROBLEM — N18.9 ACUTE RENAL FAILURE SUPERIMPOSED ON CHRONIC KIDNEY DISEASE, UNSPECIFIED CKD STAGE, UNSPECIFIED ACUTE RENAL FAILURE TYPE (HCC): Status: ACTIVE | Noted: 2018-01-01

## 2018-03-02 PROBLEM — G93.49 ENCEPHALOPATHY DUE TO INFECTION: Status: ACTIVE | Noted: 2018-01-01

## 2018-03-02 PROBLEM — D72.829 LEUKOCYTOSIS: Status: ACTIVE | Noted: 2018-01-01

## 2018-03-02 PROBLEM — E87.5 HYPERKALEMIA: Status: ACTIVE | Noted: 2018-01-01

## 2018-03-02 PROBLEM — E87.1 HYPONATREMIA: Status: ACTIVE | Noted: 2018-01-01

## 2018-03-02 NOTE — SEPSIS REASSESSMENT
[facility]    Sepsis Reassessment Note    /41   Pulse 84   Temp 98.2 °F (36.8 °C)   Resp 14   Ht 144.8 cm (4' 9\")   Wt 80.3 kg   SpO2 96%   BMI 38.30 kg/m²      3:53 PM    Cardiac:  Regularity: Regular  Rate: Normal  Heart Sounds: S1,S2    [place]

## 2018-03-02 NOTE — CONSULTS
[facility]  Report of Consultation    David Velazco Patient Status:  Emergency    1955 MRN [id number]   Location [address] [facility] Attending Carlos Meza MD   Hosp Day # 0 PCP Elissa Medina DO       REASON FOR CONSULT: PAIN MANAGEMENT  No date: FRACTURE SURGERY  3/13/2014: INJECTION, W/WO CONTRAST, DX/THERAPEUTIC SUBST*      Comment: Procedure: LUMBAR EPIDURAL;  Surgeon:                Royal Griffin MD;  Location: [address] [place] [address]  4 (four) hours as needed for Wheezing. Calcium Carbonate Antacid 500 MG Oral Chew Tab Chew 2 tablets by mouth daily as needed for Heartburn. Capsaicin 0.1 % External Cream Apply 1 Application topically every 8 (eight) hours as needed.    Cyclobenzaprine nightly. predniSONE 5 MG Oral Tab Take 5 mg by mouth daily. sennosides 8.6 MG Oral Tab Take 1 tablet by mouth 2 (two) times daily. Sevelamer Carbonate 800 MG Oral Tab Take 1,600 mg by mouth 3 (three) times daily with meals.    levETIRAcetam 250 MG Ora 100%   BMI 38.30 kg/m²   Temp (24hrs), Av.2 °F (36.8 °C), Min:98.2 °F (36.8 °C), Max:98.2 °F (36.8 °C)       Intake/Output Summary (Last 24 hours) at 18 1704  Last data filed at 18 1627   Gross per 24 hour   Intake             2409 ml   Out EOABSO 0.11 03/02/2018   BAABSO 0.04 03/02/2018       Lab Results  Component Value Date   MALBP 156.00 02/02/2017   CREUR 81.80 02/02/2017   CREUR 80.20 02/02/2017       Lab Results  Component Value Date   COLORUR Zohra (A) 09/10/2017   CLARITY Cloudy (A EKG changes. sp kayexalate in ED. will improve w RRT    CKD-MBD  -- Hyperphos -- on sevelamer 1600 TID.  Hold given crrt  -- Vit D def -- ergo [id number], resume when more stable    Anemia in ESRD  -- on mircerna as outpt, can give epo while in house if neede

## 2018-03-02 NOTE — CONSULTS
Pulmonary / Critical Care H&P/Consult       NAME: Yamila Meza - ROOM: C6/C6 - MRN: [id number] - Age: 65year old - :  1955    Date of Admission: 3/2/2018 12:31 PM  Admission Diagnosis: No admission diagnoses are documented for this encounter.     Re Location: [address] [place] [address] Drive MANAGEMENT  3/31/2014: INJECTION, W/WO CONTRAST, DX/THERAPEUTIC SUBST*      Comment: Procedure: LUMBAR EPIDURAL;  Surgeon:                Saw Radford MD;  Location: [place] [address] by mouth 3 (three) times daily as needed for Muscle spasms. Disp:  Rfl:    Fluticasone Propionate 50 MCG/ACT Nasal Suspension 2 sprays by Each Nare route daily. Disp:  Rfl:    guaiFENesin 100mg/5ml Take 100 mg by mouth every 6 (six) hours as needed.  Disp: mouth daily. Disp:  Rfl:    sennosides 8.6 MG Oral Tab Take 1 tablet by mouth 2 (two) times daily. Disp:  Rfl:    Sevelamer Carbonate 800 MG Oral Tab Take 1,600 mg by mouth 3 (three) times daily with meals.  Disp:  Rfl:    levETIRAcetam 250 MG Oral Tab Take (two) times daily. Disp:  Rfl:    Linagliptin 5 MG Oral Tab Take 5 mg by mouth daily.    Disp:  Rfl:        Scheduled Medication:  • sodium chloride 0.9%  30 mL/kg Intravenous Once   • piperacillin-tazobactam  3.375 g Intravenous Once    Followed by   • p 30.0*   PLT  152.0     No results for input(s): INR in the last 72 hours.       Recent Labs   Lab  03/02/18   1246   NA  131*   K  6.1*   CL  95*   CO2  27.0   BUN  43*       Creatinine, Serum (mg/dL)   Date Value   05/20/2016 1.67 (H)   04/18/2016 1.62 (H)

## 2018-03-02 NOTE — ED INITIAL ASSESSMENT (HPI)
Dialysis patient From [facility] with symptoms of fever and decreased level of alertness with baseline O2 sat on room air 86%.    Received on O2 at 2l/min

## 2018-03-02 NOTE — ED PROVIDER NOTES
Patient Seen in: [facility] Emergency Department    History   Patient presents with:  Fever (infectious)    Stated Complaint: fever    HPI    64-year-old female who presents to the emergency department via EMS due to report of fever.   The patient ha Niko Munoz MD;  Location: [address] [place] [address] Drive MANAGEMENT  3/31/2014: INJECTION, W/WO CONTRAST, DX/THERAPEUTIC SUBST*      Comment: Procedure: LUMBAR EPIDURAL;  Surgeon:                Mayte Waldron MD;  Location: [address] [place] [address] tenderness. Obese. Extremities: No calf tenderness. Edema is noted.     ED Course     Labs Reviewed   COMP METABOLIC PANEL (14) - Abnormal; Notable for the following:        Result Value    Glucose 108 (*)     BUN 43 (*)     Creatinine 4.89 (*)     GFR, pneumonia not excluded.      Dictated by: Indra Noonan MD on 3/02/2018 at 13:54     Approved by: Indra Noonan MD            ED Course as of Mar 02 1643  ------------------------------------------------------------       MDM   The patient likely Medication List        Present on Admission  Date Reviewed: 3/31/2017          ICD-10-CM Noted POA    Acute kidney injury [facility]) N17.9 3/2/2018 Yes    Acute renal failure (ARF) ([facility] [address] N17.9 3/2/2018 Yes    Encephalopathy, INFECTION G93.49, B99.9 3/2/2018 Yes

## 2018-03-02 NOTE — ED NOTES
Pt sleeping. Will arouse to verbal stimuli, but remains drowsy. Will engage in conversation, but speech with nurse, but falls asleep immediately after conversing.

## 2018-03-02 NOTE — ED NOTES
PT resting comfortably in cart, bilateral side rails elevated, Pt on cardiac monitor, call light in reach

## 2018-03-02 NOTE — ED NOTES
Reported that pt also fell early this a.m. From her wheelchair. Shoulder and elbow x-ray results accompany paperwork which are negative.   Pt denies new symptoms from fall

## 2018-03-03 NOTE — PAYOR COMM NOTE
--------------  ADMISSION REVIEW       3/2    ED    Fever              History of Present Illness: Patient is a 65year old female with PMH sig for esrd on HD, JESSENIA, ?seizure/parkinson's disorders,     who is a resident of [facility] and presented to ED wit Methemoglobin 0.1 (L) % SAT     Ionized Calcium 1.21 mmol/L     Allens Test Positive    Sample Site Left Radial    L/M 2.0 L/min     ABG Device Nasal cannula    Potassium Blood Gas 5.8 (H) mmol/L     Sodium Blood Gas 133 (L) mmol/L     Lactic Acid Arterial pneumonia. She was receiving IV fluids as well as antibiotic therapy. She will need hospitalization. The patient's potassium was 6.1. Chloride of 95.  731. Albumin 3.1. Alk phos 150 creatinine 4.8. BUN of 43. Glucose of 108.   Lactic acid was norm

## 2018-03-03 NOTE — PROGRESS NOTES
[facility]  Nephrology Progress Note    [address] Attending:  Tavo Chin MD       SUBJECTIVE:     Started crrt overnight. crrt clotted off around 7 am today. Off pressors since last night. Pt arousable, states she wants to urinate.   Has back  03/03/2018    03/03/2018   CO2 22.0 03/03/2018   CO2 22.0 03/03/2018       Lab Results  Component Value Date   WBC 14.7 (H) 03/03/2018   RBC 2.98 (L) 03/03/2018   HGB 10.0 (L) 03/03/2018   HCT 34.5 03/03/2018   .0 (L) 03/03/2018   MCV Followed by      Piperacillin Sod-Tazobactam So (ZOSYN) 3.375 g in dextrose 5 % 100 mL ADD-vantage 3.375 g Intravenous Q8H   azithromycin (ZITHROMAX) 500 mg in sodium chloride 0.9 % 250 mL IVPB 500 mg Intravenous Q24H   0.9%  NaCl infusion  Intravenous C (PRAVACHOL) tab 20 mg 20 mg Oral Nightly   predniSONE (DELTASONE) tab 5 mg 5 mg Oral Daily with breakfast   Senna (SENOKOT) tab TABS 8.6 mg 8.6 mg Oral BID   Sevelamer HCl (RENAGEL) tab 1,600 mg 1,600 mg Oral TID CC   TraMADol HCl (ULTRAM) tab 50 mg 50 mg removal today     Critical care time > 35 min. D/w Dr. Chidi Lay. Thank you for allowing me to participate in the care of this patient. Please do not hesitate to call with questions or concerns. Damari Cantu, [address] [facility] Nephrolog

## 2018-03-03 NOTE — PLAN OF CARE
NURSING ADMISSION NOTE      Patient admitted via Cart  Oriented to room. Safety precautions initiated. Bed in low position. Call light in reach. Received pt from ER. Droplet precautions for flu.  Pt hypotensive, notified Dr. Clark Adamson and Jayde mckinnon

## 2018-03-03 NOTE — PROGRESS NOTES
Pt just arrived from ICU, pt upset wants to order food and she is maxed out on carbs, she was on room air and desatting , 02 2L NC now. Right chest marcelina cath C/D/I. Denies pain. 1800 cc fluid rest, pt aware, tele NSR.  IV abts

## 2018-03-03 NOTE — PLAN OF CARE
NEUROLOGICAL - ADULT    • Achieves stable or improved neurological status Adequate for Discharge          CARDIOVASCULAR - ADULT    • Maintains optimal cardiac output and hemodynamic stability Completed          RESPIRATORY - ADULT    • Achieves optimal ve

## 2018-03-03 NOTE — PROGRESS NOTES
[facility] Patient Status:  Inpatient    1955 MRN [id number]   [facility] 4SW-A Attending Gagan Gonzalez MD   [facility] Day # 1 PCP Star Mayes DO     Critical Care Progress Note     Date of Admission: 3/2/2018 12:31 cap 200 mg, 200 mg, Oral, Q4H PRN  •  bisacodyl (DULCOLAX) rectal suppository 10 mg, 10 mg, Rectal, Daily PRN  •  Calcium Carbonate Antacid (TUMS) chewable tab 1,000 mg, 1,000 mg, Oral, Daily PRN  •  carbidopa-levodopa (SINEMET)  MG per tab 2 tablet, Continuous PRN  •  Insulin Aspart Pen (NOVOLOG) 100 UNIT/ML flexpen 1-5 Units, 1-5 Units, Subcutaneous, Q6H     OBJECTIVE:  /53 (BP Location: Right arm)   Pulse 88   Temp 98.1 °F (36.7 °C) (Temporal)   Resp 18   Ht 4' 9\" (1.448 m)   Wt 183 lb 13.8 o Imaging: CXR: lower lobe infiltrates bilaterally     ASSESSMENT/PLAN:     1. Septic shock: source pneumonia; possible gram negative aline.   - improved; off pressors  - RVP negative, PCT elevated  - empiric tx for HCAP with zosyn / azithro  - minimize IVF's

## 2018-03-03 NOTE — PLAN OF CARE
CARDIOVASCULAR - ADULT    • Maintains optimal cardiac output and hemodynamic stability Progressing          METABOLIC/FLUID AND ELECTROLYTES - ADULT    • Glucose maintained within prescribed range Progressing    • Electrolytes maintained within normal limi

## 2018-03-03 NOTE — RESPIRATORY THERAPY NOTE
Patient noted to be obstructing in ER. ABG drawn 7.28/56. Per Pulm MD, placed on 12/6. Patient still obstructing with volumes in low 100's. EPAP titrated to maintain open airway and achieve volumes 350-400. Current settings 22/14.  Dr. Jaspreet Espino was notified

## 2018-03-03 NOTE — PHYSICAL THERAPY NOTE
Attempted to see patient for PT evaluation. Pt refuses despite encouragement and education on benefits of increased mobility secondary to fatigue from just transferring to the commode. RN aware of pt refusal. Will follow-up as appropriate.

## 2018-03-03 NOTE — PROGRESS NOTES
[facility] Hospitalist note    PCP: Brandon Wolf, DO    Chief Complaint:  F/u septic shock    SUBJECTIVE:  Afebrile, more responsive. States she has a little back pain.      OBJECTIVE:  Temp:  [97 °F (36.1 °C)-98.2 °F (36.8 °C)] 98.1 °F (36.7 °C)  Pulse:  [75- 03/03/18   9938  03/03/18   0640  03/03/18   0755  03/03/18   0955   PGLU  48*  53*  88  147*  92       No results for input(s): TROP in the last 72 hours.       Meds:     • Heparin Sodium (Porcine)  5,000 Units Subcutaneous Q8H [address]   • multivitamin  1 tabl tomorrow     H/o parkinson's?/seizures- on sinemet and keppra but per previous neuro notes, she has had nl EEG and was supposed to wean off keppra.  Sinemet was a newer addition that was being attempted because of tremor but did not appear to have clear vaughn

## 2018-03-03 NOTE — H&P
.  CC: Patient presents with:  Fever (infectious)       PCP: Kilo Gonsales DO    History of Present Illness: Patient is a 65year old female with PMH sig for esrd on HD, JESSENIA, ?seizure/parkinson's disorders, who is a resident of [facility] and presented to MD;  Location: [place] [address] MANAGEMENT  3/31/2014: JAH-NOREENJ BY  PHYS [id number] [address] [facility] [address] [facility] 5+ YR      Comment: Procedure: LUMBAR EPIDURAL;  Surgeon:                Kelsey Black MD;  Location: [address] [place] [address] (four) times daily. Disp:  Rfl:    benzonatate 100 MG Oral Cap Take 100 mg by mouth 3 (three) times daily as needed for cough. Disp:  Rfl:    THERA-GESIC 1-15 % External Cream Apply 1 Application topically every 8 (eight) hours as needed.  Disp:  Rfl:    Tr as needed for Anxiety. Disp:  Rfl:    albuterol sulfate (2.5 MG/3ML) 0.083% Inhalation Nebu Soln Take 2.5 mg by nebulization every 4 (four) hours as needed for Wheezing. Disp:  Rfl:    nystatin 587231 UNIT/GM External Ointment Apply topically daily.  Royce Singh BS+  Derm- no rashes  MSK- good muscle strength and tone, no joint swelling  Neuro- not following commands      Diagnostic Data:    CBC/Chem  Recent Labs   Lab  03/02/18   1246   WBC  16.9*   HGB  9.1*   MCV  110.3*   PLT  152.0       Recent Labs   Lab  03 EEG and was supposed to wean off keppra. Sinemet was a newer addition that was being attempted because of tremor but did not appear to have clear evidence of Parkinson's either.     SCDs, heparin    **Certification      PHYSICIAN Certification of Need for I

## 2018-03-04 PROBLEM — G47.33 OSA ON CPAP: Status: ACTIVE | Noted: 2017-03-31

## 2018-03-04 PROBLEM — Z99.89 OSA ON CPAP: Status: ACTIVE | Noted: 2017-03-31

## 2018-03-04 NOTE — PHYSICAL THERAPY NOTE
PHYSICAL THERAPY EVALUATION - INPATIENT     Room Number: 515/515-A  Evaluation Date: 3/4/2018  Type of Evaluation: Initial  Physician Order: PT Eval and Treat    Presenting Problem: Hyperkalemia, sepsis d/t PNA and ARF on CKD  Reason for Therapy:  Mob PAIN MANAGEMENT  3/31/2014: Sofía GUARDADO & Breanne Arteaga NDL/CATH SPI DX/THER FJU      Comment: Procedure: LUMBAR EPIDURAL;  Surgeon:                Jeffrey Estrada MD;  Location: [facility] FOR                PAIN MANAGEMENT  No date: FRACTURE SURGERY  3/13/2014:  I Cognitive Status:  WFL - within functional limits  · Arousal/Alertness:  appropriate responses to stimuli  · Orientation Level:  oriented x4  · Following Commands:  follows multistep commands with increased time and follows multistep commands with repetiti Flexed knee;R Flexed knee;Comment (WBOS and mod kyphotic posture)  Stoop/Curb Assistance: Not tested       Skilled Therapy Provided: Pt presents to PT lying in semi supine position. Supine/sit at the EOB c mod A.  Pt is able to scoot and reposition sans ass from [address]. Based on this evaluation, patient's clinical presentation is evolving and overall the evaluation complexity is considered moderate.   These impairments and comorbidities manifest themselves as functional limitations in independent bed mobility, t

## 2018-03-04 NOTE — PROGRESS NOTES
[facility] Hospitalist note    PCP: Jose Roberto Heaton DO    Chief Complaint:  F/u septic shock    SUBJECTIVE:  States she still feels tired. No CP. Complains of throat irritation.      OBJECTIVE:  Temp:  [98 °F (36.7 °C)-99.2 °F (37.3 °C)] 98.4 °F (36.9 °C)  Pulse 132*  137*       Recent Labs   Lab  03/02/18   1246  03/03/18   0414  03/03/18   1946  03/04/18   0357   ALT  <6*   --    --    --    AST  16   --    --    --    ALB  3.1*  2.8*  2.5*  2.5*       Recent Labs   Lab  03/03/18   1725  03/03/18   2131  03/04/1 neg     Lethargy- improving  -likely 2/2 septic shock from PNA     Esrd on HD  -Hyperkalemia on admission, now improved  - renal consulted, appreciate.  HD tomorrow    Anemia  -Chronic, pt with ESRD  -Follow CBC     JESSENIA  -Noncompliant at [facility]  -rajiv

## 2018-03-04 NOTE — PROGRESS NOTES
1000 [facility] Patient Status:  Inpatient    1955 MRN [id number]   [facility] 5NW-A Attending Mackenzie Gaffney MD   Hosp Day # 2 PCP Leslie Stein DO     Pulm / Critical Care Progress Note     S: Pt states she feels bett 03/04/18  0400 03/04/18  0953 03/04/18  1242   BP: 123/61  115/47 114/43   BP Location: Right leg  Right leg Right leg   Pulse: 98 97 94 95   Resp: 20 18 18   Temp: 98.1 °F (36.7 °C)  98.4 °F (36.9 °C) 98.6 °F (37 °C)   TempSrc: Oral  Oral Oral   SpO2: with zosyn / azithro d#3  - minimize IVF's, HD per renal  - lactic acid wnl  2. Encephalopathy: suspect secondary to sepsis  - ABG mostly unremarkable  - clinically improved  3. Anemia: acute on chronic.  No evidence of blood loss  - monitor, transfuse for

## 2018-03-04 NOTE — PROGRESS NOTES
Pt very drowsy, when awake she knows where she is and her name. Appears a/O x 2. Remains on 02 2L NC desats when she takes 02 off. Remains on 1800 cc fluid restriction. Gwyn to right chest is C/d/I. mepilex to left hand. Lungs sounds are clear/dim.  Tel

## 2018-03-04 NOTE — RESPIRATORY THERAPY NOTE
JESSENIA : EQUIPMENT USE: DAILY SUMMARY                                            SET MODE: AUTO CPAP WITH CFLEX                                          USAGE IN HOURS: 4:03                                          90

## 2018-03-04 NOTE — PROGRESS NOTES
[address]  Nephrology Progress Note    [address] Attending:  Colin August MD       SUBJECTIVE:     Feeling much better but thinks her asthma is acting up.     PHYSICAL EXAM:     Vital Signs: /47 (BP Location: Right leg)   Pulse 94   Temp 98 (H) 03/03/2018   NEUTABS 16.93 (H) 02/16/2018   LYMPHABS 2.65 02/16/2018   EOSABS 0.00 02/16/2018   BASABS 0.00 02/16/2018   NEUT 82 02/16/2018   LYMPH 13 02/16/2018   MON 3 02/16/2018   EOS 0 02/16/2018   BASO 0 02/16/2018   NEPERCENT 81.3 03/03/2018   LY puff Inhalation Daily   Heparin Sodium (Porcine) 1000 UNIT/ML injection 1,500 Units 1.5 mL Intravenous PRN   PRISMASOL BGK 2/3.5 CRRT fluid 5000mL 2-5 L/hr CRRT Continuous   albuterol sulfate (VENTOLIN) (2.5 MG/3ML) 0.083% nebulizer solution 2.5 mg 2.5 mg liquid 30 g 30 g Oral Q15 Min PRN   Or      Glucose-Vitamin C (DEX-4) 4-0.006 g chewable tab 8 tablet 8 tablet Oral Q15 Min PRN   norepinephrine (LEVOPHED) 4 mg/250 ml premix infusion 0.5-30 mcg/min Intravenous Continuous PRN   vasopressin (PITRESSIN) 20 U

## 2018-03-05 NOTE — PROGRESS NOTES
[facility] Hospitalist note    PCP: Albina Genao DO    Chief Complaint:  F/u septic shock    SUBJECTIVE:  Feels better, breathing improved    OBJECTIVE:  Temp:  [98 °F (36.7 °C)-98.7 °F (37.1 °C)] 98.5 °F (36.9 °C)  Pulse:  [79-99] 79  Resp:  [18-20] 18  BP: 03/04/18   1556  03/05/18   0354   ALB  2.8*  2.5*  2.5*  2.6*  2.6*       Recent Labs   Lab  03/04/18   2129  03/05/18   0120  03/05/18   0638  03/05/18   1119  03/05/18   1601   PGLU  217*  167*  127*  102*  258*       No results for input(s): TROP in th inhalers    H/o parkinson's?/seizures- on sinemet and keppra but per previous neuro notes, she has had nl EEG and was supposed to wean off keppra.  Sinemet was a newer addition that was being attempted because of tremor but did not appear to have clear evid

## 2018-03-05 NOTE — RESPIRATORY THERAPY NOTE
JESSENIA - Equipment Use Daily Summary:                  . Set Mode:                . Usage in hours:                . 90% Pressure (EPAP) level:                . 90% Insp. Pressure (IPAP): Liana Holley AHI:                .  Supplemental Oxygen:

## 2018-03-05 NOTE — PAYOR COMM NOTE
--------------  CONTINUED STAY REVIEW    Payor: [employer] MEDICARE ADV HMO  Subscriber #:  [id number]  Authorization Number: N/A    Admit date: 3/2/18  Admit time: 9012    Admitting Physician: Jami Casillas MD  Attending Physician:  Jami Casillas MD  Primary now continue meds. 4. Prophy: heparin sq  5. Dispo: full code.    -clinically improving  -PT recommending subacute rehab upon discharge  -stable from pulmonary perspective for discharge home, would transition antibiotics to augmentin to complete 10 day co Units Subcutaneous (Left Lower Abdomen) Jeannine Felix, RN      HYDROcodone-acetaminophen [facility]) 5-325 MG per tab 1 tablet     Date Action Dose Route User    3/5/2018 7307 Given 1 tablet Oral Puthusseril, Wilton Holley, RN      Insulin Aspart Pen (NOVOLOG) 100 UNI 8.6 mg Oral Jackquline Doing, RN    3/4/2018 2135 Given 8.6 mg Oral Pollyann Quill, RN      TraMADol HCl Coretha Art) tab 50 mg     Date Action Dose Route User    3/5/2018 0701 Given 50 mg Oral Pollyann Quill, RN    3/4/2018 2135 Given 50 mg Oral Pollyann Quill,

## 2018-03-05 NOTE — PLAN OF CARE
MUSCULOSKELETAL - ADULT    • Return mobility to safest level of function Progressing        Patient/Family Goals    • Patient/Family Long Term Goal Progressing    • Patient/Family Short Term Goal Progressing        RESPIRATORY - ADULT    • Achieves optimal

## 2018-03-05 NOTE — PROGRESS NOTES
1000 [facility] Patient Status:  Inpatient    1955 MRN [id number]   [facility] 5NW-A Attending Julian Gee MD   Hosp Day # 3 PCP Elissa Barrientos DO     SUBJECTIVE: Pt refused APAP overnight. Otherwise no acute events. ALPRAZolam (XANAX) tab 0.25 mg, 0.25 mg, Oral, TID PRN  •  benzonatate (TESSALON) cap 200 mg, 200 mg, Oral, Q4H PRN  •  bisacodyl (DULCOLAX) rectal suppository 10 mg, 10 mg, Rectal, Daily PRN  •  Calcium Carbonate Antacid (TUMS) chewable tab 1,000 mg, 1,00 Exam:                          General: alert, cooperative, in NAD                          HEENT: oropharynx clear without erythema or exudates, moist mucous membranes                          Lungs: Clear to auscultation bilaterally, no wheezes or crackl ASSESSMENT/PLAN:  1. Pneumonia - at risk for GNR pneumonia   - RVP negative, PCT elevated  - empiric tx for HCAP with zosyn d#4, s/p 3 days azithro  2. JESSENIA: noncompliant at [facility] care  - started APAP with sleep 6-16cwp here, pt refused overnight   3.  As

## 2018-03-05 NOTE — BH PROGRESS NOTE
Went to see the pt and the nurse, Tana Christianson states the pt is on dialysis. Will check on her later.

## 2018-03-05 NOTE — OCCUPATIONAL THERAPY NOTE
Attempted to see pt for skilled OT services this date. Pt is currently politely refusing therapy secondary to pain in her R forearm at IV site. RN made aware. Will re-attempt as schedule allows.  Cristian Wiseman, 03/05/18, 2:10 PM

## 2018-03-05 NOTE — BH PROGRESS NOTE
[facility] Resource Referral Counselor Note    Farideh Stuartold Derrick Patient Status:  Inpatient    1955 MRN [id number]   [facility] 5NW-A Attending Nitesh Powell MD   Hosp Day # 3 PCP DO GRIS Stone(subjective) The pt admit

## 2018-03-05 NOTE — CM/SW NOTE
03/05/18 1500   CM/SW Referral Data   Referral Source Nurse;Patient   Reason for Referral Discharge planning   Informant Patient;Edward Staff   Pertinent Medical Hx   Primary Care Physician Name Lemuel Nickerson   Patient Info   Patient's Mental Status Alewillie

## 2018-03-05 NOTE — PLAN OF CARE
Impaired Functional Mobility    • Achieve highest/safest level of mobility/gait Progressing        METABOLIC/FLUID AND ELECTROLYTES - ADULT    • Glucose maintained within prescribed range Progressing    • Electrolytes maintained within normal limits Progre

## 2018-03-05 NOTE — PROGRESS NOTES
[facility]  Nephrology Progress Note    [address] Attending:  Harjit Pisano MD       SUBJECTIVE:     Patient seen on HD. No cramping, chest pain, shortness of breath on HD. SBP ranging between 110-130 on HD.  Ordered for 2 L UF.    PHYSICAL EXAM: 03/03/2018   MCHC 29.0 (L) 03/03/2018   RDW 16.7 (H) 03/03/2018   NEPRELIM 11.96 (H) 03/03/2018   NEUTABS 16.93 (H) 02/16/2018   LYMPHABS 2.65 02/16/2018   EOSABS 0.00 02/16/2018   BASABS 0.00 02/16/2018   NEUT 82 02/16/2018   LYMPH 13 02/16/2018   MON 3 0 1 lozenge 1 lozenge Oral PRN   Fluticasone Furoate-Vilanterol (BREO ELLIPTA) 200-25 MCG/INH inhaler 1 puff 1 puff Inhalation Daily   Heparin Sodium (Porcine) 1000 UNIT/ML injection 1,500 Units 1.5 mL Intravenous PRN   albuterol sulfate (VENTOLIN) (2.5 MG/3 Min PRN   Or      glucose (DEX4) oral liquid 30 g 30 g Oral Q15 Min PRN   Or      Glucose-Vitamin C (DEX-4) 4-0.006 g chewable tab 8 tablet 8 tablet Oral Q15 Min PRN   Insulin Aspart Pen (NOVOLOG) 100 UNIT/ML flexpen 1-5 Units 1-5 Units Subcutaneous Q6H

## 2018-03-06 NOTE — RESPIRATORY THERAPY NOTE
JESSENIA - Equipment Use Daily Summary:                  . Set Mode:                . Usage in hours:                . 90% Pressure (EPAP) level:                . 90% Insp. Pressure (IPAP): Guru Cervantes AHI:                .  Supplemental Oxygen:

## 2018-03-06 NOTE — OCCUPATIONAL THERAPY NOTE
OCCUPATIONAL THERAPY QUICK EVALUATION - INPATIENT    Room Number: 226/515-Q  Evaluation Date: 3/6/2018     Type of Evaluation: Quick Eval  Presenting Problem: sepsis secondary to PNA, hyperkalemia and acute renal failure     Physician Order: IP Consult to Ene Farfan MD;  Location: [address] [place] [address] Drive MANAGEMENT  3/31/2014: Roxanna Salazar NDL/CATH SPI DX/THER SVQ      Comment: Procedure: LUMBAR EPIDURAL;  Surgeon:                Veronica Guzman MD;  Location: [facility]  identified     OBJECTIVE  Precautions: Seizure;Bed/chair alarm;Hard of hearing; Other (Comment) (HD)  Fall Risk: High fall risk    WEIGHT BEARING RESTRICTION  Weight Bearing Restriction: None                PAIN ASSESSMENT  Rating: Unable to rate  Location: total A for all aspects of toileting for susan-care with BM. Pt was educated on safety precautions. Pt was left in her bed with questions answered, needs met and call light within reach. Pt's RN/PCT was made aware of pt's present position and condition. Therapy services. Please re-order if a new functional limitation presents during this admission.     Patient was able to achieve the following goals:  Patient able to dress lower extremities: unable to perform before admission

## 2018-03-06 NOTE — OCCUPATIONAL THERAPY NOTE
Attempted to see pt for skilled OT services this date. Pt is currently soundly sleeping, unable to awake pt. RN aware. Will re-attempt as schedule allows.  Eladio White, 03/06/18, 8:01 AM

## 2018-03-06 NOTE — PROGRESS NOTES
1000 [facility] Patient Status:  Inpatient    1955 MRN [id number]   [facility] 5NW-A Attending Jocelynn Cadet MD   Hosp Day # 4 PCP Elissa Thomas DO     SUBJECTIVE:  No acute events overnight.   Patient notes some chest mg, 10 mg, Rectal, Daily PRN  •  Calcium Carbonate Antacid (TUMS) chewable tab 1,000 mg, 1,000 mg, Oral, Daily PRN  •  carbidopa-levodopa (SINEMET)  MG per tab 2 tablet, 2 tablet, Oral, QID  •  escitalopram (LEXAPRO) tablet 10 mg, 10 mg, Oral, QAM  • membranes                          Lungs: Clear to auscultation bilaterally, no wheezes or crackles                           Chest wall: No tenderness or deformity.   HD catheter in place without surrounding erythema                          Heart: Regular

## 2018-03-06 NOTE — PHYSICAL THERAPY NOTE
PHYSICAL THERAPY TREATMENT NOTE - INPATIENT    Room Number: 655/896-J     Session: 1/3   Number of Visits to Meet Established Goals:  Other (Comment) (3-5 visits)    Presenting Problem: Hyperkalemia, sepsis d/t PNA and ARF on CKD     History related to cur YYG      Comment: Procedure: LUMBAR EPIDURAL;  Surgeon:                Brenda Simmons MD;  Location: [place] [address]  No date: FRACTURE SURGERY  3/13/2014: INJECTION, W/WO CONTRAST, DX/THERAPEUTIC SUBST*      Comment: Procedu the bed?: A Little   How much help from another person does the patient currently need. ..   -   Moving to and from a bed to a chair (including a wheelchair)?: A Lot   -   Need to walk in hospital room?: A Lot   -   Climbing 3-5 steps with a railing?: Total this am d/t wanting sinus/allergy medication despite getting her Flonase. Pt required intermittent re-direction to avoid perseverating on this and remaining on task c mobility.  Pt will cont to benefit from skilled IP PT services in order to maximize functi

## 2018-03-06 NOTE — CM/SW NOTE
MD orders received for return to facility today. No bed available at pt requested Amy for [facility] transfer. SW spoke with pt and explained.  Pt plans to return transfer to [facility] with possible long term transfer to a NH with in house dialysis, closer to

## 2018-03-06 NOTE — DISCHARGE SUMMARY
General Medicine Discharge Summary     Patient ID:  Arlyn Chahal QOPCOEG  89 year old  8/2/1955    Admit date: 3/2/2018    Discharge date and time: 3/6/18    Attending Physician: Vlad North MD     Primary Care 2/2 septic shock from PNA     Esrd on HD  -Hyperkalemia on admission, now improved  - renal consulted, appreciate.      HTN  -Normotensive off BB,  discontinued on discharge  -F/U with PCP and reintroduce BB if needed     Anemia  -Chronic, pt with ESRD  -F by mouth every 6 (six) hours as needed. Misc Natural Products (GLUCOS-CHONDROIT-MSM COMPLEX OR)  Take 3 capsules by mouth daily. furosemide 80 MG Oral Tab  Take 160 mg by mouth See Admin Instructions.  Twice daily on non-dialysis days, Sunday Monday W skin 3 (three) times daily before meals. ALPRAZolam 0.25 MG Oral Tab  Take 0.25 mg by mouth 3 (three) times daily as needed for Anxiety.       albuterol sulfate (2.5 MG/3ML) 0.083% Inhalation Nebu Soln  Take 2.5 mg by nebulization every 4 (four) hours as

## 2018-03-06 NOTE — PROGRESS NOTES
[facility]  Nephrology Progress Note    [address] Attending:  Tavo Chin MD       SUBJECTIVE:     Seen on HD. Tolerating well. No cramping. BPs are 000-297Z systolic. UF set at net neg 2.5L.     PHYSICAL EXAM:     Vital Signs: /61 (BP L 02/16/2018   BASABS 0.00 02/16/2018   NEUT 82 02/16/2018   LYMPH 13 02/16/2018   MON 3 02/16/2018   EOS 0 02/16/2018   BASO 0 02/16/2018   NEPERCENT 81.3 03/03/2018   LYPERCENT 8.6 03/03/2018   MOPERCENT 7.2 03/03/2018   EOPERCENT 0.9 03/03/2018   BAPERCEN mg 2.5 mg Nebulization Q4H PRN   Albuterol Sulfate  (90 Base) MCG/ACT inhaler 2 puff 2 puff Inhalation Q4H PRN   ALPRAZolam (XANAX) tab 0.25 mg 0.25 mg Oral TID PRN   benzonatate (TESSALON) cap 200 mg 200 mg Oral Q4H PRN   bisacodyl (DULCOLAX) recta old female w ho ESRD on HD TThS w extra treatment for UF on Monday, CHF, IDDM, HTN, JESSENIA, tremor presented with septic shock on 3/2/18 with hyperkalemia.     ESRD on HD  -- continue HD TTS with 10 mg midodrine pre-treatment 30 minutes prior to start of dial

## 2018-03-07 NOTE — PROGRESS NOTES
NURSING DISCHARGE NOTE    Discharged Nursing home via Wheelchair. Accompanied by Family member  Belongings Taken by patient/family. VSS, Iv discontinued. Discharge instructions and prescriptions given to patient and her son.  Discharge instruction

## 2018-03-07 NOTE — PROGRESS NOTES
03/06/18 1829   Clinical Encounter Type   Visited With Patient and family together   Routine Visit Introduction   Continue Visiting No  ( remains avilble at pger 2000.)    asked to [facility] for health care. Son says it has been done.   They w

## 2018-03-07 NOTE — PROGRESS NOTES
03/06/18 1829   Clinical Encounter Type   Visited With Patient and family together   Routine Visit Introduction   Continue Visiting No  ( remains avilble at pger 2000.)   Fernando inquired about POA for health care.   Pts son said they have done i

## 2018-03-07 NOTE — CM/SW NOTE
Patient discharged on 3/6/18 as previously planned.        03/07/18 0800   Discharge disposition   Discharged to: Skilled Nurs   Name of [address]   Discharge transportation Private car

## 2018-04-01 PROBLEM — R09.2 RESPIRATORY ARREST (HCC): Status: ACTIVE | Noted: 2018-01-01

## 2018-04-01 PROBLEM — E87.1 HYPONATREMIA: Status: ACTIVE | Noted: 2018-01-01

## 2018-04-01 PROBLEM — E87.2 RESPIRATORY ACIDOSIS: Status: ACTIVE | Noted: 2018-01-01

## 2018-04-01 PROBLEM — E87.3 METABOLIC ALKALOSIS: Status: ACTIVE | Noted: 2018-01-01

## 2018-04-01 NOTE — CODE DOCUMENTATION
Patient arrived CPR in progress via medics. aprox down time 6 mins PTA EMS for pre hospital. Unsure if patient was choking.

## 2018-04-01 NOTE — PROGRESS NOTES
04/01/18 1556   Clinical Encounter Type   Visited With Family   Routine Visit Introduction   Continue Visiting Yes  (Patient/family was handed off to the next )   Crisis Visit Critical care   Family Spiritual Encounters   Family Coping Anxiety;S

## 2018-04-01 NOTE — CONSULTS
Critical Care Consult     Assessment / Plan:  1. Cardiac arrest: possibly due to choking episode   - wean pressors as able  2. Acute respiratory failure  - continue full vent support  - Zosyn  3.  Abnormal CT brain - c/w severe anoxic brain injury and possi 97% 98% 98% 98%   Weight:       Height:         General: intubated  HEENT: OP clear  Respiratory: clear breath sounds bilaterally  Cardiovascular: RRR, no MRG  Abdo: soft, NTND  Ext: no edema  Skin: no rashes  Neuro: pupils are fixed and dilated  Mental st

## 2018-04-01 NOTE — PROGRESS NOTES
04/01/18 1639   Clinical Encounter Type   Visited With Family   Continue Visiting Yes   Crisis Visit ED  (full arrest)    took over case from [address] [place] [address] during shift change. Family was very worried and sad.   provided support in ED f

## 2018-04-01 NOTE — PROGRESS NOTES
[facility] Pharmacy Note:  Renal Adjustment for piperacillin/tazobactam ([address])    Nico Klein is a 65year old female who has been prescribed piperacillin/tazobactam (ZOSYN) 3,375 mg every 8 hrs.   CrCl is estimated creatinine clearance is 12.1 mL/min (A) (based

## 2018-04-01 NOTE — ED PROVIDER NOTES
Patient Seen in: [facility] Emergency Department    History   Patient presents with:  Cardiac Arrest (cardiovascular)    Stated Complaint: FULL ARREST    HPI    This is a 79-year-old female presents as a rest.  History is obtained by the medics, the tense  Extremities: His edema, cyanotic on arrival  Skin: No rashes, no pallor  Neuro: Not breathing over the vent.   No spontaneous movement  ED Course     Labs Reviewed   COMP METABOLIC PANEL (14) - Abnormal; Notable for the following:        Result Value for the following:     POC Glucose 304 (*)     All other components within normal limits   POCT GLUCOSE - Abnormal; Notable for the following:     POC Glucose 276 (*)     All other components within normal limits   CBC W/ DIFFERENTIAL - Abnormal; Notable f Likely respiratory by story though not completely clear. Parth airway was placed in the field. Vomitus in the mouth on arrival.  Patient was suctioned. CPR was continued. Parth airway was removed and patient was vigorously suctioned.   Light scope was use Yes    Metabolic alkalosis F15.0 5/3/6114 Yes    Respiratory acidosis E87.2 4/1/2018 Yes    Respiratory arrest [facility]) R09.2 4/1/2018 Unknown      A total of 45 minutes of critical care time (exclusive of billable procedures) was administered to manage the p

## 2018-04-01 NOTE — CONSULTS
[facility] - Nephrology  Report of Consultation    Baljeet Kodis Patient Status:  Emergency    8/3/1955 MRN [id number]   Location [address] Attending Josie Ruiz MD   Hosp Day # 0 PCP [facility] [facility]     Reason fo non-distended  Extremities: no LE edema   Neurologic/Psych: Defer    LABORATORY DATA:         Lab Results  Component Value Date    (H) 04/01/2018   BUN 49 (H) 04/01/2018   CREATSERUM 5.60 (H) 04/01/2018   GFRNAA 8 (L) 04/01/2018   GFRAA 9 (L) 04/01/ Diego Licona, [address] [facility] Nephrology

## 2018-04-02 NOTE — PROGRESS NOTES
Patient has [address] [place] [address] Du Lynnette Comer, [id number] and [id number]. Epic clinical support informed and charts marked for merge.    Hayley Jeffery PA-C  [facility]

## 2018-04-02 NOTE — PROGRESS NOTES
04/02/18 1734   Clinical Encounter Type   Visited With Health care provider   Routine Visit Follow-up   Continue Visiting No  ( remains available at pager 2000)   Patient's Supportive Strategies/Resources When  arrived at room to provide

## 2018-04-03 NOTE — PROCEDURES
[address] [facility] Patient Status:   - Organ Donor    1955 MRN [id number]   [facility] 4SW-A Attending Sam Gupta, *   Hosp Day # 2 PCP [facility]         Brief Procedure Report    [address] [facility]

## 2018-04-03 NOTE — PAYOR COMM NOTE
--------------  ADMISSION REVIEW     Payor: [employer] MEDICARE ADV HMO  Subscriber #:  [id number]  Authorization Number: N/A    Admit date: 4/1/18  Admit time: 1734       Admitting Physician: Thalia Calderon MD  Attending Physician:  Verito Ornelas 1339]  Resp: 25 [04/01/18 1339]  Temp: n/a  Temp src: n/a  SpO2: 100 % [04/01/18 1339]  O2 Device: Ventilator [04/01/18 1345]    Current:/55   Pulse 116   Resp 22   Ht 139.7 cm (4' 7\")   Wt 72.6 kg   SpO2 98%   BMI 37.19 kg/m²      Physical Exam  Ge Manual 5.28 (*)     Metamyelocyte Absolute Manual 0.44 (*)     Myelocyte Absolute Manual 0.66 (*)     RBC Morphology See morphology below (*)     Macrocytosis Moderate (*)     All other components within normal limits   POCT GLUCOSE - Abnormal; Notable for airway was placed in the field. Vomitus in the mouth on arrival.  Patient was suctioned. CPR was continued. Parth airway was removed and patient was vigorously suctioned. Light scope was used. 7.5 ET tube was placed. Good colorimetric change.   More br patient's respiratory instability due to respiratory arrest.  Patient was treated with IV fluids, IV Levophed, broad-spectrum antibiotics. Multiple reassessments and discussing with consultants.   This involved direct patient intervention, complex decision days    Montelukast Sodium 10 MG Oral Tab nightly  MTV daily  omeprazole 40 MG daily  ondansetron 4 MG q 8 PRN nausea  Pravastatin Sodium 20 MG nightly  predniSONE 5 MG daily  sennosides 8.6 MG BID  Sevelamer Carbonate 1600 TID with meals  levETIRAcetam 25 04/01/18   1359   ALT  8*   AST  56*   ALB  3.1*       Recent Labs   Lab  04/01/18   1434  04/01/18   1546  04/01/18   1633   PGLU  309*  304*  276*       Radiology:   CT brain 4/1/18:  · CONCLUSION:  There is complete loss of the gray-white matter differe hyperkalemia  - renal consult appreciated  - no acute indication for dialysis    # Metabolic acidosis, Hyponatremia    # severe anoxic brain injury, possible early herniation  - seen on imaging, neurology consult appreciated  - no evidence of brainstem fun atorvastatin (LIPITOR) tab 40 mg     Date Action Dose Route User    4/2/2018 2124 Given 40 mg Per NG Tube Lang Pickett, RN      Chlorhexidine Gluconate (PERIDEX) 0.12 % solution 15 mL     Date Action Dose Route User    4/2/2018 2024 Given 15 mL Mout RN    4/2/2018 1800 Rate/Dose Change 8 mcg/min Intravenous Sharmaine Christensen RN    4/2/2018 1650 New Bag 10 mcg/min Intravenous Cassy Banegas, RN      nystatin (MYCOSTATIN) suspension 500,000 Units     Date Action Dose Route User    4/3/2018 755

## 2018-04-03 NOTE — DISCHARGE SUMMARY
General Medicine Discharge Summary     Patient ID:  Ye Figueroa  65year old  1955    Admit date: 2018    Discharge date and time: 2018     Attending Physician: Esha Lugo,

## 2018-04-06 ENCOUNTER — NURSE ONLY (OUTPATIENT)
Dept: LAB | Age: 63
End: 2018-04-06
Attending: INTERNAL MEDICINE
Payer: MEDICARE

## 2018-04-06 DIAGNOSIS — R53.1 WEAKNESS: Primary | ICD-10-CM

## 2022-12-15 NOTE — BH PROGRESS NOTE
Went to see the pt for the phq4 and she was working with PT per nurse, Annette Ortega. Will see the pt tomorrow. normal...

## (undated) DEVICE — INTENDED TO BE USED TO OCCLUDE, RETRACT AND IDENTIFY ARTERIES, VEINS, TENDONS AND NERVES IN SURGICAL PROCEDURES: Brand: STERION®  VESSEL LOOP

## (undated) DEVICE — LAPAROTOMY SPONGE - RF AND X-RAY DETECTABLE PRE-WASHED: Brand: SITUATE

## (undated) DEVICE — LAPAROTOMY CDS: Brand: MEDLINE INDUSTRIES, INC.

## (undated) DEVICE — SUTURE SILK 2-0

## (undated) DEVICE — SOL  .9 1000ML BTL

## (undated) DEVICE — REM POLYHESIVE ADULT PATIENT RETURN ELECTRODE: Brand: VALLEYLAB

## (undated) DEVICE — TAPE UMBILICAL U11T

## (undated) DEVICE — BLADE ELECTRODE: Brand: EDGE

## (undated) DEVICE — CAUTERY PENCIL

## (undated) DEVICE — SUTURE SILK 2-0 SH

## (undated) DEVICE — MEDI-VAC NON-CONDUCTIVE SUCTION TUBING: Brand: CARDINAL HEALTH

## (undated) DEVICE — CHLORAPREP 26ML APPLICATOR

## (undated) DEVICE — SUTURE SILK 3-0

## (undated) DEVICE — HEMOCLIP HORIZON MED 002200

## (undated) DEVICE — PROXIMATE SKIN STAPLERS (35 WIDE) CONTAINS 35 STAINLESS STEEL STAPLES (FIXED HEAD): Brand: PROXIMATE

## (undated) DEVICE — SUTURE SILK 0

## (undated) DEVICE — SUTURE SILK 3-0 SH

## (undated) DEVICE — HEMOCLIP HORIZON LG 004200

## (undated) DEVICE — SUTURE SILK 4-0

## (undated) DEVICE — SUTURE ETHILON 2-0 LR

## (undated) DEVICE — TRANSPOSAL ULTRAFLEX DUO/QUAD ULTRA CART MANIFOLD

## (undated) DEVICE — MEDI-VAC SUCTION HANDLE REGULAR CAPACITY: Brand: CARDINAL HEALTH

## (undated) DEVICE — KENDALL SCD EXPRESS SLEEVES, KNEE LENGTH, MEDIUM: Brand: KENDALL SCD

## (undated) DEVICE — SUTURE PDS II 1 TP-1

## (undated) NOTE — IP AVS SNAPSHOT
Patient Demographics     Address  [place] [address] Phone  [phone] ([phone] [facility]) E-mail Address  [email or web address]      Emergency Contact(s)     Name Relation Home Work Quentin Daughter [phone] bisacodyl 10 MG Supp  Commonly known as:  DULCOLAX  Next dose due:  9/1 9am       Place 10 mg rectally daily. calciTRIOL 0.25 MCG Caps  Commonly known as:  ROCALTROL  Next dose due:  9/1 9am       Take 0.25 mcg by mouth daily.           carbidopa-l Commonly known as:  LANTUS  Next dose due:  8/31 8 pm       Inject 16 Units into the skin nightly.           Insulin Lispro 100 UNIT/ML Soln  Commonly known as:  HUMALOG  Next dose due:  Give insulin 3x daily before meals       Inject into the skin 3 (three Take 4 mg by mouth every 8 (eight) hours as needed for Nausea. Pravastatin Sodium 20 MG Tabs  Commonly known as:  PRAVACHOL  Next dose due:  8/31 9pm       Take 1 tablet (20 mg total) by mouth nightly.    Elissa Garcia, DO [employer] [id number] Levothyroxine Sodium (SYNTHROID) tab 50 mcg 08/31/17 0642 Given      [id number] Midodrine HCl (PROAMATINE) tab 10 mg 08/31/17 0857 Given      [id number] Montelukast Sodium (SINGULAIR) tab 10 mg 08/30/17 2140 Given      [id number] Pantoprazole Sodiu [id number] insulin detemir (LEVEMIR) 100 UNIT/ML flextouch 10 Units 08/30/17 2141 Given                    Recent Vital Signs    Flowsheet Row Most Recent Value   Vitals  124/47 Filed at 08/31/2017 1147   Pulse  66 Filed at 08/31/2017 1147   Resp  18 Filed Chloride 102 101 - 111 mmol/L — [facility]   CO2 24.0 22.0 - 32.0 mmol/L [facility]            MAGNESIUM [[id number]] (Normal)  Resulted: 08/31/17 0650, Result status: Final result   Ordering provider:   Cheryl Hodge MD  08/30/17 5267 Resulting lab Filed:  8/30/2017  1:26 AM Date of Service:  8/29/2017  4:14 PM Status:  Signed    :  John Zamora MD (Physician)       [facility] Hospitalist History and Physical[NB.1]      Patient presents with:  Pain (neurologic)[NB.2]       PCP:[NB.1] Eilssa Anderson Comment: Procedure: LUMBAR EPIDURAL;  Surgeon:                Helane Closs, MD;  Location: [address] [place] [address] Drive MANAGEMENT  3/31/2014: Marin GUARDADO & Brooke Bolaños NDL/CATH SPI DX/THER HCP      Comment: Procedure: LUMBAR EPIDURAL;  Surgeon: Nose: Nares normal. rmal. No drainage. Throat:[NB.1] MMM[NB.3]   Neck: Supple, symmetrical, trachea midline   Lungs:   Clear to auscultation bilaterally. Normal effort   Chest wall:  No deformity.    Heart:  Regular rate and rhythm   Abdomen:   Soft, non- of the disc space C6-7, and near obliteration of the disc space at C7-T1. If this is not from previous surgical fusion, then this narrowing represents sequela of severe disc degeneration. Less severe disc degeneration at C5-6.  Upper levels show normal dis -narcotics PRN, sparingly.   -PT/OT eval    ESRD on HD  -renal consulted  -continue outpatient meds    T2DM  -Hold orals  -Accuchecks  -SSI  -detemir    HFpEF, HTN, HL  -continue home meds    Anemia  -Chronic, 2/2 ESRD  -Continue to monitor    Hypothyroid however, this was not clear and her recent EEG was negative, so her Keppra was planned on being weaned and the patient was planned on being started on Sinemet but this had not yet been completed.   The patient notes that she has severe shaking and tremulous She has masked facies. She can follow all commands. She has significant right upper extremity resting tremor, bradykinesia, and rigidity.  Left side not as pronounced although she also has an upper extremity what appears to be a postural essential type t Imaging Results (HF patients)    Chest X-Ray Results (HF patients only)    No exam resulted this encounter. 2D Echocardiogram Results (HF patients only)    No exam resulted this encounter.       Cath Angiogram Results (HF Patients only)    No exam resu Second attempt at PT eval. Pt continues to refuse due to fatigue despite encouragement. Pt requests PT return tomorrow. Will check back as schedule allows. RN aware. [MD.1]      Attribution Magallon    MD.1 - Juan Dolan, PT on 8/30/2017  1:23 PM agreeable to therapy at this time despite encouragement. Agreeable to re-attempt at another time.[SH.1]     Second attempt to see pt. Pt still sleeping, now stating she has been having nightmares every time she falls asleep.  Not agreeable to therapy this d

## (undated) NOTE — LETTER
[facility]  [address] [address] [facility], [address] [place] [address]    Consent for Operation    Date: __________________    Time: _______________    1.  I authorize the performance upon Beba Celeste the following operation:      Creation of left arm artiovenous fi videotape. The \[facility] [place] [facility]\"" will not be responsible for storage or maintenance of this tape. 6. For the purpose of advancing medical education, I consent to the admittance of observers to the Operating Room.     7. I authorize the use of any specimen, organs Signature of Patient:   ___________________________    When the patient is a minor or mentally incompetent to give consent:  Signature of person authorized to consent for patient: ___________________________   Relationship to patient: _____________________ these medicines may increase my risk of anesthetic complications. · If I am allergic to anything or have had a reaction to anesthesia before. 3. I understand how the anesthesia medicine will help me (benefits).     4. I understand that with any type of patient’s representative) and answered their questions. The patient or their representative has agreed to have anesthesia services.     _____________________________________________________________________________  Witness        Date   Time  I have oma

## (undated) NOTE — IP AVS SNAPSHOT
[address]            (For Outpatient Use Only) Initial Admit Date: 8/29/2017   Inpt/Obs Admit Date: Inpt: N/A / Obs: 08/29/17   Discharge Date:    Coco Campos:  [de-identified]   MRN: [de-identified]   CSN: [id number]        [facility] Account Financial Class: Medicare Advantage    August 31, 2017